# Patient Record
Sex: FEMALE | Race: WHITE | NOT HISPANIC OR LATINO | Employment: OTHER | URBAN - METROPOLITAN AREA
[De-identification: names, ages, dates, MRNs, and addresses within clinical notes are randomized per-mention and may not be internally consistent; named-entity substitution may affect disease eponyms.]

---

## 2017-01-31 DIAGNOSIS — M47.816 SPONDYLOSIS OF LUMBAR REGION WITHOUT MYELOPATHY OR RADICULOPATHY: Primary | ICD-10-CM

## 2017-02-01 ENCOUNTER — TELEPHONE (OUTPATIENT)
Dept: PAIN MEDICINE | Facility: CLINIC | Age: 67
End: 2017-02-01

## 2017-02-15 ENCOUNTER — OFFICE VISIT (OUTPATIENT)
Dept: NEUROSURGERY | Facility: CLINIC | Age: 67
End: 2017-02-15

## 2017-02-15 VITALS — WEIGHT: 216 LBS | TEMPERATURE: 97.6 F | BODY MASS INDEX: 34.72 KG/M2 | HEIGHT: 66 IN

## 2017-02-15 DIAGNOSIS — M47.816 FACET ARTHRITIS OF LUMBAR REGION: ICD-10-CM

## 2017-02-15 DIAGNOSIS — M51.36 DEGENERATIVE DISC DISEASE, LUMBAR: ICD-10-CM

## 2017-02-15 DIAGNOSIS — M48.062 LUMBAR STENOSIS WITH NEUROGENIC CLAUDICATION: Primary | ICD-10-CM

## 2017-02-15 PROCEDURE — 99203 OFFICE O/P NEW LOW 30 MIN: CPT | Performed by: NEUROLOGICAL SURGERY

## 2017-02-15 RX ORDER — MONTELUKAST SODIUM 10 MG/1
10 TABLET ORAL DAILY
COMMUNITY
Start: 2017-02-09 | End: 2017-10-11

## 2017-02-15 NOTE — PROGRESS NOTES
Patient: Una Villarreal  : 1950    Primary Care Provider: Zachary Reddy DO    Requesting Provider: As above      History    Chief Complaint: Low back pain.    History of Present Illness: The patient is a 66-year-old retired  who describes a 5-6-year history of low back pain.  She denies any inciting or precipitating events.  If she stands or walks too long she gets a burning in her thighs and her legs will get heavy.  She reports no bowel or bladder dysfunction.  She's been treated with Neurontin.  She has undergone multiple rhizotomies.  She's had a single epidural injection done.  She has also been treated with physical therapy.  Sometimes sitting is helpful.  She can lie in certain positions and feels better but other positions are more uncomfortable.  She is worse with standing, bending, walking, or pursuing housework.    Review of Systems   Constitutional: Positive for activity change and fatigue. Negative for appetite change, chills, diaphoresis, fever and unexpected weight change.   HENT: Negative for congestion, dental problem, drooling, ear discharge, ear pain, facial swelling, hearing loss, mouth sores, nosebleeds, postnasal drip, rhinorrhea, sinus pressure, sneezing, sore throat, tinnitus, trouble swallowing and voice change.    Eyes: Negative for photophobia, pain, discharge, redness, itching and visual disturbance.   Respiratory: Positive for shortness of breath. Negative for apnea, cough, choking, chest tightness, wheezing and stridor.    Cardiovascular: Negative for chest pain, palpitations and leg swelling.   Gastrointestinal: Positive for constipation. Negative for abdominal distention, abdominal pain, anal bleeding, blood in stool, diarrhea, nausea, rectal pain and vomiting.   Endocrine: Negative for cold intolerance, heat intolerance, polydipsia, polyphagia and polyuria.   Genitourinary: Negative for decreased urine volume, difficulty urinating, dysuria, enuresis,  "flank pain, frequency, genital sores, hematuria and urgency.   Musculoskeletal: Positive for arthralgias, back pain and joint swelling. Negative for gait problem, myalgias, neck pain and neck stiffness.   Skin: Negative for color change, pallor, rash and wound.   Allergic/Immunologic: Positive for environmental allergies and food allergies. Negative for immunocompromised state.   Neurological: Positive for headaches. Negative for dizziness, tremors, seizures, syncope, facial asymmetry, speech difficulty, weakness, light-headedness and numbness.   Hematological: Positive for adenopathy. Does not bruise/bleed easily.   Psychiatric/Behavioral: Positive for dysphoric mood. Negative for agitation, behavioral problems, confusion, decreased concentration, hallucinations, self-injury, sleep disturbance and suicidal ideas. The patient is not nervous/anxious and is not hyperactive.        The patient's past medical history, past surgical history, family history, and social history have been reviewed at length in the electronic medical record.    Physical Exam:   Visit Vitals   • Temp 97.6 °F (36.4 °C)   • Ht 66\" (167.6 cm)   • Wt 216 lb (98 kg)   • BMI 34.86 kg/m2     CONSTITUTIONAL: Patient is well-nourished, pleasant and appears stated age.  CV: Heart regular rate and rhythm without murmur, rub, or gallop.  PULMONARY: Lungs are clear to ascultation.  MUSCULOSKELETAL:  Straight leg raising is negative.  Gary's Sign is negative.  ROM in back normal.  Tenderness in the back to palpation is not observed.  NEUROLOGICAL:  Orientation, memory, attention span, language function, and cognition have been examined and are intact.  Strength is intact in the lower extremities to direct testing.  Muscle tone is normal throughout.  Station and gait are stooped.  Sensation is intact to light touch testing throughout.  Deep tendon reflexes are 2+ and symmetrical at the knees and trace at the ankles.  Coordination is intact.      Medical " Decision Making    Data Review:   MRI of the lumbar spine dated 9/20/2016 demonstrates multilevel degenerative disc disease.  There is some diffuse facet arthropathy.  There is some mild canal narrowing at L4-5.    Diagnosis:   The most part the patient is afflicted with mechanical low back pain.  Some of her complaints do raise the specter of symptomatic neurogenic claudication.    Treatment Options:   I have recommended that the patient undergo lumbar CT myelography with upright images to see if there is significant stenosis in excess of what we are seeing on her MRI.  Further recommendations will ensue thereafter.       Diagnosis Plan   1. Lumbar stenosis with neurogenic claudication     2. Degenerative disc disease, lumbar     3. Facet arthritis of lumbar region         Scribed for Ever Samaniego MD by Aneta Duarte CMA on 02/15/2017 at 3:06 PM    I, Dr. Samaniego, personally performed the services described in the documentation, as scribed in my presence, and it is both accurate and complete.

## 2017-02-21 ENCOUNTER — HOSPITAL ENCOUNTER (INPATIENT)
Dept: INTERVENTIONAL RADIOLOGY/VASCULAR | Facility: HOSPITAL | Age: 67
LOS: 2 days | Discharge: HOME | End: 2017-02-23
Attending: NEUROLOGICAL SURGERY | Admitting: NEUROLOGICAL SURGERY

## 2017-02-21 ENCOUNTER — HOSPITAL ENCOUNTER (OUTPATIENT)
Dept: CT IMAGING | Facility: HOSPITAL | Age: 67
Discharge: HOME | End: 2017-02-21

## 2017-02-21 ENCOUNTER — APPOINTMENT (OUTPATIENT)
Dept: CT IMAGING | Facility: HOSPITAL | Age: 67
End: 2017-02-21

## 2017-02-21 ENCOUNTER — APPOINTMENT (OUTPATIENT)
Dept: GENERAL RADIOLOGY | Facility: HOSPITAL | Age: 67
End: 2017-02-21

## 2017-02-21 ENCOUNTER — APPOINTMENT (OUTPATIENT)
Dept: NEUROLOGY | Facility: HOSPITAL | Age: 67
End: 2017-02-21

## 2017-02-21 DIAGNOSIS — M48.062 LUMBAR STENOSIS WITH NEUROGENIC CLAUDICATION: ICD-10-CM

## 2017-02-21 PROBLEM — R56.9 GENERALIZED SEIZURE (HCC): Status: ACTIVE | Noted: 2017-02-21

## 2017-02-21 LAB
ANION GAP SERPL CALCULATED.3IONS-SCNC: 9 MMOL/L (ref 3–11)
BUN BLD-MCNC: 14 MG/DL (ref 9–23)
BUN/CREAT SERPL: 17.5 (ref 7–25)
CALCIUM SPEC-SCNC: 9.5 MG/DL (ref 8.7–10.4)
CHLORIDE SERPL-SCNC: 100 MMOL/L (ref 99–109)
CO2 SERPL-SCNC: 23 MMOL/L (ref 20–31)
CREAT BLD-MCNC: 0.8 MG/DL (ref 0.6–1.3)
DEPRECATED RDW RBC AUTO: 45.2 FL (ref 37–54)
ERYTHROCYTE [DISTWIDTH] IN BLOOD BY AUTOMATED COUNT: 12.8 % (ref 11.3–14.5)
GFR SERPL CREATININE-BSD FRML MDRD: 72 ML/MIN/1.73
GLUCOSE BLD-MCNC: 99 MG/DL (ref 70–100)
HCT VFR BLD AUTO: 37.9 % (ref 34.5–44)
HGB BLD-MCNC: 12.8 G/DL (ref 11.5–15.5)
MCH RBC QN AUTO: 32.6 PG (ref 27–31)
MCHC RBC AUTO-ENTMCNC: 33.8 G/DL (ref 32–36)
MCV RBC AUTO: 96.4 FL (ref 80–99)
PLATELET # BLD AUTO: 248 10*3/MM3 (ref 150–450)
PMV BLD AUTO: 9.5 FL (ref 6–12)
POTASSIUM BLD-SCNC: 4 MMOL/L (ref 3.5–5.5)
RBC # BLD AUTO: 3.93 10*6/MM3 (ref 3.89–5.14)
SODIUM BLD-SCNC: 132 MMOL/L (ref 132–146)
WBC NRBC COR # BLD: 5.65 10*3/MM3 (ref 3.5–10.8)

## 2017-02-21 PROCEDURE — 95819 EEG AWAKE AND ASLEEP: CPT

## 2017-02-21 PROCEDURE — 72120 X-RAY BEND ONLY L-S SPINE: CPT

## 2017-02-21 PROCEDURE — 70450 CT HEAD/BRAIN W/O DYE: CPT

## 2017-02-21 PROCEDURE — 85027 COMPLETE CBC AUTOMATED: CPT | Performed by: NEUROLOGICAL SURGERY

## 2017-02-21 PROCEDURE — 25010000003 LEVETIRACETAM IN NACL 0.75% 1000 MG/100ML SOLUTION: Performed by: NEUROLOGICAL SURGERY

## 2017-02-21 PROCEDURE — 0 IOPAMIDOL 41 % SOLUTION: Performed by: NEUROLOGICAL SURGERY

## 2017-02-21 PROCEDURE — 25010000002 PROMETHAZINE PER 50 MG: Performed by: NEUROLOGICAL SURGERY

## 2017-02-21 PROCEDURE — 72265 MYELOGRAPHY L-S SPINE: CPT

## 2017-02-21 PROCEDURE — 72131 CT LUMBAR SPINE W/O DYE: CPT

## 2017-02-21 PROCEDURE — 80048 BASIC METABOLIC PNL TOTAL CA: CPT | Performed by: NEUROLOGICAL SURGERY

## 2017-02-21 PROCEDURE — 25010000002 LEVETIRACETAM IN NACL 0.82% 500 MG/100ML SOLUTION: Performed by: PHYSICIAN ASSISTANT

## 2017-02-21 PROCEDURE — 25010000002 LORAZEPAM PER 2 MG: Performed by: NEUROLOGICAL SURGERY

## 2017-02-21 PROCEDURE — 25810000003 DEXTROSE-NACL PER 500 ML: Performed by: PHYSICIAN ASSISTANT

## 2017-02-21 RX ORDER — CITALOPRAM 40 MG/1
40 TABLET ORAL DAILY
COMMUNITY
End: 2021-02-23 | Stop reason: ALTCHOICE

## 2017-02-21 RX ORDER — PROMETHAZINE HYDROCHLORIDE 25 MG/ML
25 INJECTION, SOLUTION INTRAMUSCULAR; INTRAVENOUS ONCE AS NEEDED
Status: COMPLETED | OUTPATIENT
Start: 2017-02-21 | End: 2017-02-21

## 2017-02-21 RX ORDER — SODIUM CHLORIDE, SODIUM LACTATE, POTASSIUM CHLORIDE, CALCIUM CHLORIDE 600; 310; 30; 20 MG/100ML; MG/100ML; MG/100ML; MG/100ML
100 INJECTION, SOLUTION INTRAVENOUS CONTINUOUS
Status: DISCONTINUED | OUTPATIENT
Start: 2017-02-21 | End: 2017-02-21

## 2017-02-21 RX ORDER — ONDANSETRON 2 MG/ML
4 INJECTION INTRAMUSCULAR; INTRAVENOUS EVERY 6 HOURS PRN
Status: DISCONTINUED | OUTPATIENT
Start: 2017-02-21 | End: 2017-02-23 | Stop reason: HOSPADM

## 2017-02-21 RX ORDER — PROMETHAZINE HYDROCHLORIDE 25 MG/1
25 TABLET ORAL ONCE AS NEEDED
Status: DISCONTINUED | OUTPATIENT
Start: 2017-02-21 | End: 2017-02-23 | Stop reason: HOSPADM

## 2017-02-21 RX ORDER — MAGNESIUM HYDROXIDE/ALUMINUM HYDROXICE/SIMETHICONE 120; 1200; 1200 MG/30ML; MG/30ML; MG/30ML
30 SUSPENSION ORAL ONCE AS NEEDED
Status: DISCONTINUED | OUTPATIENT
Start: 2017-02-21 | End: 2017-02-23 | Stop reason: HOSPADM

## 2017-02-21 RX ORDER — LIDOCAINE HYDROCHLORIDE 10 MG/ML
5 INJECTION, SOLUTION INFILTRATION; PERINEURAL ONCE
Status: COMPLETED | OUTPATIENT
Start: 2017-02-21 | End: 2017-02-21

## 2017-02-21 RX ORDER — DEXTROSE AND SODIUM CHLORIDE 5; .9 G/100ML; G/100ML
125 INJECTION, SOLUTION INTRAVENOUS CONTINUOUS
Status: DISCONTINUED | OUTPATIENT
Start: 2017-02-21 | End: 2017-02-22

## 2017-02-21 RX ORDER — LEVOTHYROXINE SODIUM 0.05 MG/1
50 TABLET ORAL DAILY
COMMUNITY
End: 2021-02-23 | Stop reason: DRUGHIGH

## 2017-02-21 RX ORDER — ONDANSETRON 4 MG/1
4 TABLET, FILM COATED ORAL ONCE AS NEEDED
Status: DISCONTINUED | OUTPATIENT
Start: 2017-02-21 | End: 2017-02-23 | Stop reason: HOSPADM

## 2017-02-21 RX ORDER — LEVETIRACETAM 5 MG/ML
500 INJECTION INTRAVASCULAR EVERY 12 HOURS SCHEDULED
Status: DISCONTINUED | OUTPATIENT
Start: 2017-02-21 | End: 2017-02-22

## 2017-02-21 RX ORDER — ACETAMINOPHEN 10 MG/ML
1000 INJECTION, SOLUTION INTRAVENOUS EVERY 8 HOURS PRN
Status: DISCONTINUED | OUTPATIENT
Start: 2017-02-21 | End: 2017-02-23 | Stop reason: HOSPADM

## 2017-02-21 RX ORDER — LORAZEPAM 2 MG/ML
1 INJECTION INTRAMUSCULAR ONCE
Status: COMPLETED | OUTPATIENT
Start: 2017-02-21 | End: 2017-02-21

## 2017-02-21 RX ORDER — ACETAMINOPHEN 500 MG
500 TABLET ORAL EVERY 4 HOURS PRN
Status: DISCONTINUED | OUTPATIENT
Start: 2017-02-21 | End: 2017-02-23 | Stop reason: HOSPADM

## 2017-02-21 RX ORDER — LEVETIRACETAM 10 MG/ML
1000 INJECTION INTRAVASCULAR
Status: COMPLETED | OUTPATIENT
Start: 2017-02-21 | End: 2017-02-21

## 2017-02-21 RX ADMIN — LORAZEPAM 1 MG: 2 INJECTION INTRAMUSCULAR; INTRAVENOUS at 09:34

## 2017-02-21 RX ADMIN — LORAZEPAM 2 MG: 2 INJECTION INTRAMUSCULAR; INTRAVENOUS at 09:42

## 2017-02-21 RX ADMIN — LORAZEPAM 1 MG: 2 INJECTION INTRAMUSCULAR; INTRAVENOUS at 09:20

## 2017-02-21 RX ADMIN — LORAZEPAM 1 MG: 2 INJECTION INTRAMUSCULAR; INTRAVENOUS at 09:15

## 2017-02-21 RX ADMIN — SODIUM CHLORIDE, POTASSIUM CHLORIDE, SODIUM LACTATE AND CALCIUM CHLORIDE 100 ML/HR: 600; 310; 30; 20 INJECTION, SOLUTION INTRAVENOUS at 08:45

## 2017-02-21 RX ADMIN — PROMETHAZINE HYDROCHLORIDE 25 MG: 25 INJECTION INTRAMUSCULAR; INTRAVENOUS at 08:45

## 2017-02-21 RX ADMIN — LEVETIRACETAM 500 MG: 5 INJECTION INTRAVENOUS at 20:03

## 2017-02-21 RX ADMIN — DEXTROSE AND SODIUM CHLORIDE 125 ML/HR: 5; 900 INJECTION, SOLUTION INTRAVENOUS at 15:02

## 2017-02-21 RX ADMIN — LIDOCAINE HYDROCHLORIDE 5 ML: 10 INJECTION, SOLUTION INFILTRATION; PERINEURAL at 07:32

## 2017-02-21 RX ADMIN — IOPAMIDOL 20 ML: 408 INJECTION, SOLUTION INTRATHECAL at 07:35

## 2017-02-21 RX ADMIN — LEVETIRACETAM 1000 MG: 10 INJECTION INTRAVENOUS at 09:45

## 2017-02-21 RX ADMIN — DEXTROSE AND SODIUM CHLORIDE 125 ML/HR: 5; 900 INJECTION, SOLUTION INTRAVENOUS at 22:27

## 2017-02-21 NOTE — NURSING NOTE
Dr Samaniego returned call. Informed of patient seizing, Ativan given. Pt post ictal at present. Vital signs being monitored

## 2017-02-21 NOTE — NURSING NOTE
Called to room by spouse. Patient seizing. Turned to side, suction available, O2 per nc started and rapid response called. Dr Samaniego paged to inform.

## 2017-02-21 NOTE — NURSING NOTE
Dr Samaniego paged and returned call. Notified of pt with N/V - order for IV Phenergan. Order received to start IV and fluids, additional order for IV Zofran if needed.

## 2017-02-21 NOTE — NURSING NOTE
Pt reports having surgery on lt hand last week. No drsg, sutures clean and dry. No redness or edema.

## 2017-02-21 NOTE — NURSING NOTE
Returned to room. Complains of headache, nausea. Sipping water and pepsi. Band aid drsg mid lower back. Head of bed elevated and encouraged to drink additional fluids.

## 2017-02-21 NOTE — H&P
Neurosurgical Admission    Admitting Physician:  Ever Samaniego M.D.    Reason for Admission: New onset grand mal seizure status post lumbar myelogram.    Chief complaint:  Seizure. Headache.    History of present illness:  Mrs. Villarreal is a 66-year-old left-handed retired  who has history of multilevel degenerative disc disease of the lumbar spine and question of neurogenic claudication.  She was recently seen by Dr. Samaniego in his office on 2/15/2017, at which time he reviewed an MRI scan of her lumbar spine dated 9/20/2016 that demonstrated multilevel degenerative disc disease, some diffuse facet arthropathy as well as mild canal narrowing at L4-L5.  Her complaints on that visit also raised the specter of symptomatic neurogenic claudication.  Dr. Samaniego recommended that the patient undergo lumbar CT myelography with upright images to see if there was indeed significant stenosis in excess of what was seen on her MRI scan.  Today, the patient underwent the CT myelogram, but suffered a post-procedure grand mal seizure.  Rapid response was called and she was subsequently given several doses of Ativan and 1000 mg of Keppra for seizure control.  EEG was later obtained and did not reveal seizure activity.  She does not have seizure history.  The patient is currently somnolent, but opens eyes to voice.  She minimally converses, but follows most commands.  She complains of a headache.  She is moving all extremities.  A CT scan of the brain has been ordered.    History:  Past Medical History   Diagnosis Date   • Alcohol dependence      continuous drinking   • Arthritis    • Depression    • Encephalitis    • Facet syndrome, lumbar    • Generalized seizure 2/21/2017   • Headache    • Lumbar canal stenosis    • Meniscus tear      right knee   • RA (rheumatoid arthritis)      Past Surgical History   Procedure Laterality Date   • Appendectomy     • Hysterectomy     • Carpal tunnel release with cubital tunnel  "release Right    • Tonsillectomy     • Cholecystectomy  01/2016   • Tubal abdominal ligation     • Tumor removal  1990     fatty tumor on right shoulder   • Elbow procedure  1996     \"fish cutters elbow\" repair   • Knee cartilage surgery  2005   • Brain surgery  2007     pituitary tumor   • Cholecystectomy     • Trigger finger release Left 02/17/2017     Family History   Problem Relation Age of Onset   • Lung cancer Maternal Grandmother    • Stroke Maternal Grandfather    • Lung cancer Maternal Grandfather    • Stroke Paternal Grandmother    • Brain cancer Other    • COPD Mother    • Heart failure Father      Social History   Substance Use Topics   • Smoking status: Former Smoker     Quit date: 2/15/1987   • Smokeless tobacco: Never Used   • Alcohol use Yes      Comment: socially       Medications:    Current Facility-Administered Medications:   •  acetaminophen (TYLENOL) tablet 500 mg, 500 mg, Oral, Q4H PRN, Ever Samaniego MD  •  aluminum-magnesium hydroxide-simethicone (MAALOX/MYLANTA) suspension 30 mL, 30 mL, Oral, Once PRN, Ever Samaniego MD  •  dextrose 5 % and sodium chloride 0.9 % infusion, 125 mL/hr, Intravenous, Continuous, Cammie Feliciano PA-C  •  ondansetron (ZOFRAN) injection 4 mg, 4 mg, Intravenous, Q6H PRN, Ever Samaniego MD  •  ondansetron (ZOFRAN) tablet 4 mg, 4 mg, Oral, Once PRN, Ever Samaniego MD  •  promethazine (PHENERGAN) tablet 25 mg, 25 mg, Oral, Once PRN, Ever Samaniego MD    Allergies:  Aspirin; Codeine; Other; Trichophyton; and Latex    Review of Systems:  Unable to obtain due to patient's altered mental status.    Vital Signs:   Temp:  [97.8 °F (36.6 °C)] 97.8 °F (36.6 °C)  Heart Rate:  [66-97] 84  Resp:  [18-20] 18  BP: (122-198)/() 155/93    Physical Exam:  Gen: Well-nourished, well-developed white female in no acute distress.  Normocephalic, atraumatic.  HEENT: Hearing intact bilaterally.  Extraocular movements grossly intact.  Pupils equal, round, reactive to light and " accommodation.    Neck: Range of motion within normal limits.  Heart: Regular rate and rhythm without murmur, gallop or rub.  Lungs: Clear to auscultation bilaterally.  Abdomen: Soft.  Nontender.  Positive bowel sounds in 4 quadrants.  Vascular: Radial and pedal pulses 2+ bilaterally.  No carotid bruits appreciated.  Extremities: No clubbing, cyanosis or edema.  Neuromotor exam: Patient is mostly somnolent, more than likely due to antiseizure medications on board, but arouses and opens eyes to vocal command and tactile stimulus. She is oriented to place and year.  She follows most commands appropriately; having to be redirected at times.  Speech is intermittently clear and fluent, otherwise nonsensical at times.  Motor strength is 5 out of 5 in the upper and lower extremities.  She is able to understand and express that sensation to light touch is intact throughout.  Bicep and tricep reflexes difficult to elicit bilaterally; trace patella bilaterally, difficult to elicit in the Achilles bilaterally.  Cranial nerve exam: Cranial nerve II: Funduscopic exam does not reveal edema. Exam is limited due to patient's somnolence.  Cranial nerves III, IV and VI: Pupils equal, round, reactive to light and accommodation.  Extra ocular movements grossly intact.  Cranial nerve V: Facial sensation is intact and equal to light touch throughout.  Cranial nerve VII: Smile is slight, but equal.  Cranial nerve VIII: Hearing intact to finger rub bilaterally.  Cranial nerves IX and X difficult to test;; normal phonation.  Cranial nerve XI: Shoulder shrug intact bilaterally.  Cranial nerve XII: Tongue is midline without atrophy or fasciculation.  Cerebellar and gait testing unable to be tested due to patient's mental status.      Results from last 7 days  Lab Units 02/21/17  0918   WBC 10*3/mm3 5.65   HEMOGLOBIN g/dL 12.8   HEMATOCRIT % 37.9   PLATELETS 10*3/mm3 248       Results from last 7 days  Lab Units 02/21/17  0918   SODIUM mmol/L  132   POTASSIUM mmol/L 4.0   CHLORIDE mmol/L 100   TOTAL CO2 mmol/L 23.0   BUN mg/dL 14   CREATININE mg/dL 0.80   CALCIUM mg/dL 9.5   GLUCOSE mg/dL 99       Impression:  1.  Post lumbar myelogram grand mal seizure; now controlled with medication, no seizure activity on EEG  2.  Mechanical low back pain with lumbar stenosis and neurogenic claudication.  3.  Depression  4.  History of alcohol dependence; very minimal alcohol consumption over the last year according to .  5.  Restless leg syndrome.  6.  Migraines.  7.  Rheumatoid Arthritis.  8.  COPD  9.  History of pituitary tumor surgery 2007; on DDAVP.  10.  Patient is on thyroid replacement medication.   thinks patient has had thyroid surgery, but is unsure.    Plan:   Admission to the ICU for monitoring and continued anti-seizure medication/control.  Obtain CT scan of the brain.        Cammie Feliciano PA-C  02/21/17  11:26 AM

## 2017-02-21 NOTE — NURSING NOTE
Brook MALAGON for Dr. Samaniego in to see pt. Arouses with stimulus,slow to respond, yet oriented to person, place and time.

## 2017-02-21 NOTE — PROGRESS NOTES
MYELOGRAM PROCEDURE NOTE  Neurosurgery    Patient: Una Villarreal  : 1950      PreOp Diagnosis: Lumbar stenosis    PostOp Diagnosis: Same    Procedure: Lumbar myelogram    Surgeon: Aden    Anesthesia: 1% lidocaine    Technique:   Spinal needle: 22 gauge   Contrast: Isovue 200 (20ml)   Injection site: L L3    EBL: Trace    Specimens removed: None    Complication: None        Ever Samaniego MD  17  7:33 AM

## 2017-02-21 NOTE — PLAN OF CARE
Problem: Pressure Ulcer Risk (Maurilio Scale) (Adult,Obstetrics,Pediatric)  Goal: Identify Related Risk Factors and Signs and Symptoms  Outcome: Ongoing (interventions implemented as appropriate)  Goal: Skin Integrity  Outcome: Ongoing (interventions implemented as appropriate)    Problem: Seizure Disorder/Epilepsy (Adult)  Goal: Signs and Symptoms of Listed Potential Problems Will be Absent or Manageable (Seizure Disorder/Epilepsy)  Outcome: Ongoing (interventions implemented as appropriate)

## 2017-02-22 PROCEDURE — 25010000003 LEVETIRACETAM IN NACL 0.75% 1000 MG/100ML SOLUTION: Performed by: PHYSICIAN ASSISTANT

## 2017-02-22 PROCEDURE — 25010000002 LORAZEPAM PER 2 MG

## 2017-02-22 PROCEDURE — 94760 N-INVAS EAR/PLS OXIMETRY 1: CPT

## 2017-02-22 PROCEDURE — 25810000003 DEXTROSE-NACL PER 500 ML: Performed by: PHYSICIAN ASSISTANT

## 2017-02-22 PROCEDURE — 94799 UNLISTED PULMONARY SVC/PX: CPT

## 2017-02-22 PROCEDURE — 94640 AIRWAY INHALATION TREATMENT: CPT

## 2017-02-22 PROCEDURE — B00B1ZZ PLAIN RADIOGRAPHY OF SPINAL CORD USING LOW OSMOLAR CONTRAST: ICD-10-PCS | Performed by: NEUROLOGICAL SURGERY

## 2017-02-22 PROCEDURE — 25010000002 LEVETIRACETAM IN NACL 0.82% 500 MG/100ML SOLUTION: Performed by: PHYSICIAN ASSISTANT

## 2017-02-22 RX ORDER — CITALOPRAM 40 MG/1
40 TABLET ORAL DAILY
Status: DISCONTINUED | OUTPATIENT
Start: 2017-02-22 | End: 2017-02-23 | Stop reason: HOSPADM

## 2017-02-22 RX ORDER — MONTELUKAST SODIUM 10 MG/1
10 TABLET ORAL DAILY
Status: DISCONTINUED | OUTPATIENT
Start: 2017-02-22 | End: 2017-02-23 | Stop reason: HOSPADM

## 2017-02-22 RX ORDER — TIZANIDINE 4 MG/1
2 TABLET ORAL 3 TIMES DAILY
Status: DISCONTINUED | OUTPATIENT
Start: 2017-02-22 | End: 2017-02-23 | Stop reason: HOSPADM

## 2017-02-22 RX ORDER — DESMOPRESSIN ACETATE 0.2 MG/1
100 TABLET ORAL 2 TIMES DAILY
Status: DISCONTINUED | OUTPATIENT
Start: 2017-02-22 | End: 2017-02-23 | Stop reason: HOSPADM

## 2017-02-22 RX ORDER — ACETAMINOPHEN 500 MG
500 TABLET ORAL EVERY 4 HOURS PRN
Status: DISCONTINUED | OUTPATIENT
Start: 2017-02-22 | End: 2017-02-23 | Stop reason: HOSPADM

## 2017-02-22 RX ORDER — SUMATRIPTAN 6 MG/.5ML
6 INJECTION, SOLUTION SUBCUTANEOUS ONCE AS NEEDED
Status: DISCONTINUED | OUTPATIENT
Start: 2017-02-22 | End: 2017-02-23 | Stop reason: HOSPADM

## 2017-02-22 RX ORDER — LORAZEPAM 2 MG/ML
INJECTION INTRAMUSCULAR
Status: DISCONTINUED
Start: 2017-02-22 | End: 2017-02-22 | Stop reason: WASHOUT

## 2017-02-22 RX ORDER — TOPIRAMATE 25 MG/1
50 TABLET ORAL DAILY
Status: DISCONTINUED | OUTPATIENT
Start: 2017-02-22 | End: 2017-02-23 | Stop reason: HOSPADM

## 2017-02-22 RX ORDER — PRAVASTATIN SODIUM 40 MG
40 TABLET ORAL DAILY
Status: DISCONTINUED | OUTPATIENT
Start: 2017-02-22 | End: 2017-02-23 | Stop reason: HOSPADM

## 2017-02-22 RX ORDER — FAMOTIDINE 20 MG/1
40 TABLET, FILM COATED ORAL NIGHTLY
Status: DISCONTINUED | OUTPATIENT
Start: 2017-02-22 | End: 2017-02-23 | Stop reason: HOSPADM

## 2017-02-22 RX ORDER — PANTOPRAZOLE SODIUM 40 MG/1
40 TABLET, DELAYED RELEASE ORAL
Status: DISCONTINUED | OUTPATIENT
Start: 2017-02-22 | End: 2017-02-23 | Stop reason: HOSPADM

## 2017-02-22 RX ORDER — LORAZEPAM 2 MG/ML
INJECTION INTRAMUSCULAR
Status: COMPLETED
Start: 2017-02-22 | End: 2017-02-22

## 2017-02-22 RX ORDER — LEVETIRACETAM 500 MG/1
500 TABLET ORAL 2 TIMES DAILY
Qty: 60 TABLET | Refills: 1 | Status: SHIPPED | OUTPATIENT
Start: 2017-02-22 | End: 2017-02-23

## 2017-02-22 RX ORDER — GABAPENTIN 400 MG/1
400 CAPSULE ORAL 3 TIMES DAILY
Status: DISCONTINUED | OUTPATIENT
Start: 2017-02-22 | End: 2017-02-23 | Stop reason: HOSPADM

## 2017-02-22 RX ORDER — ROPINIROLE 0.5 MG/1
0.25 TABLET, FILM COATED ORAL NIGHTLY
Status: DISCONTINUED | OUTPATIENT
Start: 2017-02-22 | End: 2017-02-23 | Stop reason: HOSPADM

## 2017-02-22 RX ORDER — BUSPIRONE HYDROCHLORIDE 10 MG/1
10 TABLET ORAL 2 TIMES DAILY
Status: DISCONTINUED | OUTPATIENT
Start: 2017-02-22 | End: 2017-02-23 | Stop reason: HOSPADM

## 2017-02-22 RX ORDER — LORAZEPAM 2 MG/ML
INJECTION INTRAMUSCULAR
Status: DISPENSED
Start: 2017-02-22 | End: 2017-02-22

## 2017-02-22 RX ORDER — CETIRIZINE HYDROCHLORIDE 10 MG/1
5 TABLET ORAL DAILY
Status: DISCONTINUED | OUTPATIENT
Start: 2017-02-22 | End: 2017-02-23 | Stop reason: HOSPADM

## 2017-02-22 RX ORDER — MIRTAZAPINE 15 MG/1
15 TABLET, FILM COATED ORAL NIGHTLY
Status: DISCONTINUED | OUTPATIENT
Start: 2017-02-22 | End: 2017-02-23 | Stop reason: HOSPADM

## 2017-02-22 RX ORDER — LEVOTHYROXINE SODIUM 0.05 MG/1
50 TABLET ORAL DAILY
Status: DISCONTINUED | OUTPATIENT
Start: 2017-02-22 | End: 2017-02-23 | Stop reason: HOSPADM

## 2017-02-22 RX ORDER — LEVETIRACETAM 10 MG/ML
1000 INJECTION INTRAVASCULAR EVERY 12 HOURS
Status: DISCONTINUED | OUTPATIENT
Start: 2017-02-22 | End: 2017-02-23 | Stop reason: HOSPADM

## 2017-02-22 RX ADMIN — ALBUTEROL SULFATE 2.5 MG: 2.5 SOLUTION RESPIRATORY (INHALATION) at 21:00

## 2017-02-22 RX ADMIN — DEXTROSE AND SODIUM CHLORIDE 125 ML/HR: 5; 900 INJECTION, SOLUTION INTRAVENOUS at 05:54

## 2017-02-22 RX ADMIN — GABAPENTIN 400 MG: 400 CAPSULE ORAL at 15:37

## 2017-02-22 RX ADMIN — FAMOTIDINE 40 MG: 20 TABLET ORAL at 20:06

## 2017-02-22 RX ADMIN — TIZANIDINE 2 MG: 4 TABLET ORAL at 20:06

## 2017-02-22 RX ADMIN — LORAZEPAM 2 MG: 2 INJECTION INTRAMUSCULAR; INTRAVENOUS at 11:45

## 2017-02-22 RX ADMIN — CETIRIZINE HYDROCHLORIDE 5 MG: 10 TABLET, FILM COATED ORAL at 12:30

## 2017-02-22 RX ADMIN — MIRTAZAPINE 15 MG: 15 TABLET, FILM COATED ORAL at 20:06

## 2017-02-22 RX ADMIN — MONTELUKAST SODIUM 10 MG: 10 TABLET, FILM COATED ORAL at 12:30

## 2017-02-22 RX ADMIN — ROPINIROLE HYDROCHLORIDE 0.25 MG: 0.5 TABLET, FILM COATED ORAL at 20:06

## 2017-02-22 RX ADMIN — LEVOTHYROXINE SODIUM 50 MCG: 50 TABLET ORAL at 12:29

## 2017-02-22 RX ADMIN — LEVETIRACETAM 1000 MG: 10 INJECTION INTRAVENOUS at 14:20

## 2017-02-22 RX ADMIN — TOPIRAMATE 50 MG: 25 TABLET, FILM COATED ORAL at 12:30

## 2017-02-22 RX ADMIN — BUSPIRONE HYDROCHLORIDE 10 MG: 10 TABLET ORAL at 12:32

## 2017-02-22 RX ADMIN — DESMOPRESSIN ACETATE 100 MCG: 0.2 TABLET ORAL at 17:09

## 2017-02-22 RX ADMIN — LEVETIRACETAM 500 MG: 5 INJECTION INTRAVENOUS at 08:21

## 2017-02-22 RX ADMIN — TIZANIDINE 2 MG: 4 TABLET ORAL at 15:37

## 2017-02-22 RX ADMIN — BUSPIRONE HYDROCHLORIDE 10 MG: 10 TABLET ORAL at 17:09

## 2017-02-22 RX ADMIN — DESMOPRESSIN ACETATE 100 MCG: 0.2 TABLET ORAL at 14:29

## 2017-02-22 RX ADMIN — GABAPENTIN 400 MG: 400 CAPSULE ORAL at 20:06

## 2017-02-22 RX ADMIN — TIZANIDINE 2 MG: 4 TABLET ORAL at 12:35

## 2017-02-22 RX ADMIN — GABAPENTIN 400 MG: 400 CAPSULE ORAL at 12:30

## 2017-02-22 RX ADMIN — CITALOPRAM HYDROBROMIDE 40 MG: 40 TABLET ORAL at 12:32

## 2017-02-22 RX ADMIN — PRAVASTATIN SODIUM 40 MG: 40 TABLET ORAL at 12:29

## 2017-02-22 NOTE — PROGRESS NOTES
Adult Nutrition  Assessment/PES    Patient Name:  Una Villarreal  YOB: 1950  MRN: [id number]  Admit Date:  2/21/2017    Assessment Date:  2/22/2017        Reason for Assessment       02/22/17 1526    Reason for Assessment    Reason For Assessment/Visit multidisciplinary rounds    Identified At Risk By Screening Criteria no indicators present    Time Spent (min) 15    Diagnosis Diagnosis   notes and dx of this adm reviewed   Patient Active Problem List   Diagnosis   • Doris's syndrome   • Arthralgia of hip   • Lumbar facet arthropathy/lumbar spondylosis without myelopathy   • Greater trochanteric bursitis of both hips   • DDD (degenerative disc disease), lumbar   • Myofascial pain   • History of sacroiliac joint dysfunction   • Right knee meniscal tear   • Depression   • Physical deconditioning   • History of alcohol dependence   • Overweight (BMI 25.0-29.9)   • Restless legs syndrome (RLS)   • Generalized seizure                Nutrition/Diet History       02/22/17 1526    Nutrition/Diet History    Reported/Observed By RN    Other eating; had witnessed seizure this am              Labs/Tests/Procedures/Meds       02/22/17 1527    Labs/Tests/Procedures/Meds    Labs/Tests Review Reviewed                Nutrition Prescription Ordered       02/22/17 1527    Nutrition Prescription PO    Current PO Diet Regular            Evaluation of Received Nutrient/Fluid Intake       02/22/17 1527    PO Evaluation    Number of Days PO Intake Evaluated 1 day    Number of Meals 2    % PO Intake 75              Problem/Interventions:        Problem 1       02/22/17 1528    Nutrition Diagnoses Problem 1    Problem 1 No Nutrition Diagnosis at this Time                    Intervention Goal       02/22/17 1528    Intervention Goal    General Maintain nutrition            Nutrition Intervention       02/22/17 1528    Nutrition Intervention    RD/Tech Action Follow Tx progress;Care plan reviewd               Education/Evaluation       02/22/17 1529    Monitor/Evaluation    Monitor Per protocol        Comments:      Electronically signed by:  Cammie Sanz RD  02/22/17 3:29 PM

## 2017-02-22 NOTE — PROGRESS NOTES
Discharge Planning Assessment  [facility]     Patient Name: Una Villarreal  MRN: [id number]  Today's Date: 2/22/2017    Admit Date: 2/21/2017          Discharge Needs Assessment       02/22/17 1107    Living Environment    Lives With spouse    Living Arrangements house   Lives in 1 level home in [place].    Quality Of Family Relationships supportive    Able to Return to Prior Living Arrangements yes    Discharge Needs Assessment    Concerns To Be Addressed basic needs concerns;adjustment to diagnosis/illness concerns    Readmission Within The Last 30 Days no previous admission in last 30 days    Outpatient/Agency/Support Group Needs --   She has never used a HH agency.    Equipment Currently Used at Home none    Transportation Available car;family or friend will provide            Discharge Plan       02/22/17 1109    Case Management/Social Work Plan    Plan Home    Patient/Family In Agreement With Plan yes    Additional Comments Mrs. Villarreal lives in 1 level home c her spouse.  She has been independent c ADL's.  She has never used DME, HH or Home O2.  She states her Medicare and [employer] are current.  Her goal is to return home c assist from her .  CM will follow.          Discharge Placement     No information found        Expected Discharge Date and Time     Expected Discharge Date Expected Discharge Time    Feb 21, 2017               Demographic Summary       02/22/17 1059    Referral Information    Admission Type inpatient    Arrived From admitted as an inpatient    Referral Source admission list    Reason For Consult discharge planning    Contact Information    Permission Granted to Share Information With             Functional Status       02/22/17 1059    Functional Status Current    Ambulation 2-->assistive person    Transferring 2-->assistive person    Toileting 2-->assistive person    Bathing 2-->assistive person    Dressing 2-->assistive person    Eating 2-->assistive person     Communication 0-->understands/communicates without difficulty    Functional Status Prior    Ambulation 0-->independent    Transferring 0-->independent    Toileting 0-->independent    Bathing 0-->independent    Dressing 0-->independent    Eating 0-->independent    Communication 0-->understands/communicates without difficulty    Swallowing 0-->swallows foods/liquids without difficulty    IADL    Medications independent    Meal Preparation independent    Housekeeping independent    Laundry independent    Shopping independent    Oral Care independent            Psychosocial     None            Abuse/Neglect     None            Legal     None            Substance Abuse     None            Patient Forms     None          Selena Parikh RN

## 2017-02-22 NOTE — PLAN OF CARE
Problem: Seizure Disorder/Epilepsy (Adult)  Goal: Signs and Symptoms of Listed Potential Problems Will be Absent or Manageable (Seizure Disorder/Epilepsy)  Outcome: Ongoing (interventions implemented as appropriate)

## 2017-02-23 VITALS
OXYGEN SATURATION: 98 % | DIASTOLIC BLOOD PRESSURE: 73 MMHG | HEART RATE: 65 BPM | TEMPERATURE: 97.9 F | HEIGHT: 66 IN | BODY MASS INDEX: 34.72 KG/M2 | WEIGHT: 216.05 LBS | SYSTOLIC BLOOD PRESSURE: 141 MMHG | RESPIRATION RATE: 16 BRPM

## 2017-02-23 PROCEDURE — 94640 AIRWAY INHALATION TREATMENT: CPT

## 2017-02-23 PROCEDURE — 25010000003 LEVETIRACETAM IN NACL 0.75% 1000 MG/100ML SOLUTION: Performed by: PHYSICIAN ASSISTANT

## 2017-02-23 RX ORDER — LEVETIRACETAM 500 MG/1
1000 TABLET ORAL 2 TIMES DAILY
Qty: 60 TABLET | Refills: 1 | Status: SHIPPED | OUTPATIENT
Start: 2017-02-23 | End: 2017-05-04 | Stop reason: ALTCHOICE

## 2017-02-23 RX ADMIN — ALBUTEROL SULFATE 2.5 MG: 2.5 SOLUTION RESPIRATORY (INHALATION) at 07:44

## 2017-02-23 RX ADMIN — LEVETIRACETAM 1000 MG: 10 INJECTION INTRAVENOUS at 02:05

## 2017-02-23 RX ADMIN — ACETAMINOPHEN 500 MG: 500 TABLET ORAL at 06:00

## 2017-02-23 RX ADMIN — PANTOPRAZOLE SODIUM 40 MG: 40 TABLET, DELAYED RELEASE ORAL at 05:52

## 2017-02-23 NOTE — PLAN OF CARE
Problem: Seizure Disorder/Epilepsy (Adult)  Goal: Signs and Symptoms of Listed Potential Problems Will be Absent or Manageable (Seizure Disorder/Epilepsy)  Outcome: Outcome(s) achieved Date Met:  02/23/17

## 2017-02-23 NOTE — NURSING NOTE
Educated patient to call the office of the PCP & Dr. Peterson for an appointment 1 week. Patient wanted to eat breakfast before discharge but chose not stay long enough for the offices to open for us to make these appointments.

## 2017-02-23 NOTE — PROGRESS NOTES
"NEUROSURGERY PROGRESS NOTE     LOS: 2 days   Patient Care Team:  Zachary Reddy DO as PCP - General  Cheikh Alves MD as Consulting Physician (Anesthesiology)    Chief Complaint:   1. Low back pain.  2. Post myelogram seizure.    Interval History:   Patient Complaints: None.  Patient Denies: A, further seizures.    Review of Systems:   Musculoskeletal and Neurological systems were reviewed and are negative except for:  Musculoskeletal: positive for back pain.    Vital Signs: Blood pressure 141/73, pulse 61, temperature 97.9 °F (36.6 °C), temperature source Oral, resp. rate 16, height 66\" (167.6 cm), weight 216 lb 0.8 oz (98 kg), SpO2 98 %.  Intake/Output:   Intake/Output Summary (Last 24 hours) at 02/23/17 0609  Last data filed at 02/23/17 0200   Gross per 24 hour   Intake      0 ml   Output   1250 ml   Net  -1250 ml       Physical Exam:  Patient is awake, alert, and in good spirits.  She is well oriented.  Her speech is fluent.  She follows all commands.  There is no pronator drift.     Assessment/Plan:  1. Low back pain: The patient harbors a mild spinal stenosis. I do not think that these findings are significant enough to explain her chronic severe back pain syndrome. I do not think there is a role for surgical intervention on her lumbar spine at this point in time.  2. Post myelogram seizure: We'll continue Keppra for the short-term.  There was a question yesterday as to whether she had some additional seizures.  I'm not completely convinced that that was the case.  Her Keppra has been increased to 1000 mg twice a day.  3. Disposition: Mobilize patient.  Home later this morning.  The patient will follow up in our clinic in several weeks at which time we will wean off the Keppra.      Ever Samaniego MD  02/23/17  6:09 AM      "

## 2017-02-23 NOTE — PLAN OF CARE
Problem: Pressure Ulcer Risk (Maurilio Scale) (Adult,Obstetrics,Pediatric)  Goal: Identify Related Risk Factors and Signs and Symptoms  Outcome: Outcome(s) achieved Date Met:  02/23/17  Goal: Skin Integrity  Outcome: Outcome(s) achieved Date Met:  02/23/17

## 2017-02-24 NOTE — DISCHARGE SUMMARY
Date of Discharge:  2/24/2017      Problem List:  Principal Problem:    Generalized seizure      Presenting Problem/History of Present Illness  Lumbar stenosis with neurogenic claudication [M48.06]  Generalized seizure [R56.9]      Hospital Course  Mrs. Villarreal is a 66-year-old left-handed retired  who has history of multilevel degenerative disc disease of the lumbar spine, low back pain, and question of neurogenic claudication. She was recently seen by Dr. Samaniego in his office on 2/15/2017, at which time he reviewed an MRI scan of her lumbar spine dated 9/20/2016 that demonstrated multilevel degenerative disc disease, some diffuse facet arthropathy as well as mild canal narrowing at L4-L5. Her complaints on that visit also raised the specter of symptomatic neurogenic claudication. Dr. Samaniego recommended that the patient undergo lumbar CT myelography with upright images to see if there was indeed significant stenosis in excess of what was seen on her MRI scan. The patient underwent the CT myelogram, but suffered a post-procedure grand mal seizure. Rapid response was called and she was subsequently given several doses of Ativan and 1000 mg of Keppra for seizure control. EEG was later obtained and did not reveal seizure activity.  She did not have seizure history.  She was admitted to the ICU for observation and medical management.  Hospital day #2, the pt was awake, alert, moving all extremities, and ambulating.  Later in the morning, she suffered further seizures. The CT myelogram showed very mild truncation of the nerve roots bilaterally at the L4-5 level.  There was no evidence of instability on flexion/extension views.  Her Keppra dose was increased to 1000mg BID.  No other seizures were reported.  Hospital day #3, the pt was awake, alert, and oriented.   She was taking po and following all commands.  She was taking po and ambulating.  She was subsequently discharged home.        Consults:    Consults     No orders found from 1/23/2017 to 2/22/2017.          Condition on Discharge:  Stable and satisfactory.      Discharge Disposition  Home or Self Care    Discharge Medications   Una Villarreal   Home Medication Instructions RADHA:[id number]    Printed on:02/24/17 0905   Medication Information                      acetaminophen (TYLENOL) 500 MG tablet  Take 500 mg by mouth Every 4 (Four) Hours As Needed.             albuterol (PROAIR HFA) 108 (90 BASE) MCG/ACT inhaler  Inhale 2 puffs 3 (Three) Times a Day.             busPIRone (BUSPAR) 10 MG tablet  Take 10 mg by mouth 2 (Two) Times a Day.             citalopram (CeleXA) 40 MG tablet  Take 40 mg by mouth Daily.             desmopressin (DDAVP) 0.1 MG tablet  Take 0.1 mg by mouth 2 (Two) Times a Day.             dexlansoprazole (DEXILANT) 60 MG capsule  Take 60 mg by mouth 2 (Two) Times a Day.             EPINEPHrine (EPIPEN 2-MICHELET) 0.3 MG/0.3ML solution auto-injector injection  EpiPen 2-Michelet 0.3 MG/0.3ML KATHY; Patient Sig: EpiPen 2-Michelet 0.3 MG/0.3ML KATHY USE AS DIRECTED AS NEEDED; 0; 28-Jan-2014; Active; [employer]             gabapentin (NEURONTIN) 100 MG capsule  Take 1 po QID             Gel Base gel  Baclofen 5%, Cyclobenzaprine 4%, Ketoprofen 10%, Lidocaine 10%, Prilocaine 2%, - Apply 1-2 grams to affected area 3-4 times daily prn             levETIRAcetam (KEPPRA) 500 MG tablet  Take 2 tablets by mouth 2 (Two) Times a Day.             levocetirizine (XYZAL) 5 MG tablet  Take 5 mg by mouth Every Evening.             levothyroxine (SYNTHROID, LEVOTHROID) 50 MCG tablet  Take 50 mcg by mouth Daily.             mirtazapine (REMERON) 15 MG tablet  Take 15 mg by mouth every night.             montelukast (SINGULAIR) 10 MG tablet  Take 10 mg by mouth Daily.             pravastatin (PRAVACHOL) 40 MG tablet  Take 40 mg by mouth Daily.             ranitidine (ZANTAC) 300 MG tablet               rOPINIRole (REQUIP) 0.25 MG tablet  Take 1 tablet by  mouth Every Night. Take 1 hour before bedtime.             SUMAtriptan (IMITREX) 6 MG/0.5ML solution injection  Inject  under the skin 1 (One) Time As Needed.             tiZANidine (ZANAFLEX) 2 MG tablet  Take 1 tablet by mouth 3 (Three) Times a Day.             topiramate (TOPAMAX) 50 MG tablet  Take 50 mg by mouth Daily.                 Discharge Diet   Diet Instructions     Drink Plenty of Fluid Over Next 24 Hours                     Activity at Discharge  Activity Instructions     Elevate Head of Bed 30 Degrees for 24 Hours           No Strenuous Activity for 24 Hours                     Follow-up Appointments  Future Appointments  Date Time Provider Department Center   3/3/2017 12:00 PM Ever Samaniego MD [facility] None     Additional Instructions for the Follow-ups that You Need to Schedule     Discharge Follow Up (Specify Details)    As directed    Follow-up with Dr. Samaniego this week in office.   Follow Up:  1 Week                 Cammie Feliciano PA-C

## 2017-03-06 RX ORDER — ROPINIROLE 0.25 MG/1
TABLET, FILM COATED ORAL
Qty: 90 TABLET | Refills: 0 | Status: SHIPPED | OUTPATIENT
Start: 2017-03-06 | End: 2017-10-19

## 2017-03-22 ENCOUNTER — OFFICE VISIT (OUTPATIENT)
Dept: NEUROSURGERY | Facility: CLINIC | Age: 67
End: 2017-03-22

## 2017-03-22 VITALS — WEIGHT: 219 LBS | HEIGHT: 66 IN | TEMPERATURE: 97.2 F | BODY MASS INDEX: 35.2 KG/M2

## 2017-03-22 DIAGNOSIS — M51.36 DEGENERATIVE DISC DISEASE, LUMBAR: Primary | ICD-10-CM

## 2017-03-22 DIAGNOSIS — M48.062 LUMBAR STENOSIS WITH NEUROGENIC CLAUDICATION: ICD-10-CM

## 2017-03-22 DIAGNOSIS — M47.816 FACET ARTHRITIS OF LUMBAR REGION: ICD-10-CM

## 2017-03-22 PROCEDURE — 99213 OFFICE O/P EST LOW 20 MIN: CPT | Performed by: PHYSICIAN ASSISTANT

## 2017-03-22 RX ORDER — HYDROCODONE BITARTRATE AND ACETAMINOPHEN 5; 325 MG/1; MG/1
TABLET ORAL
COMMUNITY
Start: 2017-02-20 | End: 2017-05-04

## 2017-03-22 NOTE — PROGRESS NOTES
"Patient: Una Villarreal  : 1950  Chart #: [id number]    Date of Service: 2017    CHIEF COMPLAINT: 1.  Low back pain  2.  Post myelogram seizure    History of Present Illness Ms. Villarreal is a 66-year-old retired  who has a history of multi- level degenerative disc disease of the lumbar spine.  She has seen Dr. Alves and undergone extensive nonoperative measures. Her MRI demonstrated multilevel degenerative disc disease, diffuse facet arthropathy as well as some mild canal narrowing at L4 5.  Her symptoms raise specter of neurogenic claudication.  Dr. Samaniego recommended that the patient undergo lumbar CT myelography with upright images to see if there was indeed significant stenosis in excess of what was seen on her MRI scan.  The patient had the procedure on 2017 and unfortunately suffered a postprocedural grand mal seizure.  She has no prior history of seizure.  EEG was later obtained it did not reveal seizure activity. She was discharged on Keppra 1000mg BID.  Pt has had a few episodes of blanking out \"staring into space\".  Her back continues to bother her.  Dr. Samaniego had previously discussed surgery was not indicated. She is [place] outside [place] trip in  of this years and would like to know if medically she could go.     The following portions of the patient's history were reviewed and updated as appropriate: allergies, current medications, past family history, past medical history, past social history, past surgical history and problem list.    Review of Systems   Constitutional: Positive for fatigue. Negative for activity change, appetite change, chills, diaphoresis, fever and unexpected weight change.   HENT: Negative for congestion, dental problem, drooling, ear discharge, ear pain, facial swelling, hearing loss, mouth sores, nosebleeds, postnasal drip, rhinorrhea, sinus pressure, sneezing, sore throat, tinnitus, trouble swallowing and voice " change.    Eyes: Negative for photophobia, pain, discharge, redness, itching and visual disturbance.   Respiratory: Positive for shortness of breath. Negative for apnea, cough, choking, chest tightness, wheezing and stridor.    Cardiovascular: Negative for chest pain, palpitations and leg swelling.   Gastrointestinal: Negative for abdominal distention, abdominal pain, anal bleeding, blood in stool, constipation, diarrhea, nausea, rectal pain and vomiting.   Endocrine: Negative for cold intolerance, heat intolerance, polydipsia, polyphagia and polyuria.   Genitourinary: Negative for decreased urine volume, difficulty urinating, dysuria, enuresis, flank pain, frequency, genital sores, hematuria and urgency.   Musculoskeletal: Positive for back pain. Negative for arthralgias, gait problem, joint swelling, myalgias, neck pain and neck stiffness.   Skin: Negative for color change, pallor, rash and wound.   Allergic/Immunologic: Negative for environmental allergies, food allergies and immunocompromised state.   Neurological: Positive for dizziness and headaches. Negative for tremors, seizures, syncope, facial asymmetry, speech difficulty, weakness, light-headedness and numbness.   Hematological: Negative for adenopathy. Does not bruise/bleed easily.   Psychiatric/Behavioral: Positive for confusion. Negative for agitation, behavioral problems, decreased concentration, dysphoric mood, hallucinations, self-injury, sleep disturbance and suicidal ideas. The patient is not nervous/anxious and is not hyperactive.    All other systems reviewed and are negative.      Objective   Vital Signs: There were no vitals taken for this visit.  Physical Exam   Constitutional: She is oriented to person, place, and time. She appears well-developed and well-nourished. No distress.   HENT:   Head: Normocephalic and atraumatic.   Eyes: EOM are normal. Pupils are equal, round, and reactive to light.   Neck: Normal range of motion.    Musculoskeletal: Normal range of motion. She exhibits no edema.   Neurological: She is alert and oriented to person, place, and time. She has normal reflexes. No cranial nerve deficit. Coordination normal.   Psychiatric: She has a normal mood and affect. Her behavior is normal. Thought content normal.   Nursing note and vitals reviewed.      Assessment/Plan   Medical Decision Making: Ms. Villarreal has diffuse degenerative changes to her lumbar spine.  She has mild spinal stenosis.  Dr. Samaneigo does not feel that her findings are significant enough to explain her chronic severe back pain syndrome and has not recommended surgery.  With regard to her post myelogram seizure, we will wean her from her keppra.  She is taking 500 mg BID and will go to daily dosing for a couple weeks and then stop.  There is some question as to whether she has had some additional absence seizures since her discharge.  If she continues with these episodes of blanking out she will notify our office and we will refer her to neurology. We will see her as needed.                    Carline Reddy MA  Patient Care Team:  Zachary Redyd DO as PCP - General  Cheikh Alves MD as Referring Physician (Anesthesiology)

## 2017-05-04 ENCOUNTER — OFFICE VISIT (OUTPATIENT)
Dept: PAIN MEDICINE | Facility: CLINIC | Age: 67
End: 2017-05-04

## 2017-05-04 VITALS
OXYGEN SATURATION: 100 % | DIASTOLIC BLOOD PRESSURE: 87 MMHG | HEIGHT: 66 IN | RESPIRATION RATE: 18 BRPM | SYSTOLIC BLOOD PRESSURE: 147 MMHG | TEMPERATURE: 97.6 F

## 2017-05-04 DIAGNOSIS — M70.62 GREATER TROCHANTERIC BURSITIS OF BOTH HIPS: ICD-10-CM

## 2017-05-04 DIAGNOSIS — M48.062 LUMBAR STENOSIS WITH NEUROGENIC CLAUDICATION: ICD-10-CM

## 2017-05-04 DIAGNOSIS — M79.18 MYOFASCIAL PAIN: ICD-10-CM

## 2017-05-04 DIAGNOSIS — M47.816 LUMBAR FACET ARTHROPATHY: ICD-10-CM

## 2017-05-04 DIAGNOSIS — F10.21 HISTORY OF ALCOHOL DEPENDENCE (HCC): ICD-10-CM

## 2017-05-04 DIAGNOSIS — M51.26 LUMBAR DISC DISPLACEMENT WITHOUT MYELOPATHY: ICD-10-CM

## 2017-05-04 DIAGNOSIS — R53.81 PHYSICAL DECONDITIONING: ICD-10-CM

## 2017-05-04 DIAGNOSIS — G25.81 RESTLESS LEGS SYNDROME (RLS): ICD-10-CM

## 2017-05-04 DIAGNOSIS — M70.61 GREATER TROCHANTERIC BURSITIS OF BOTH HIPS: ICD-10-CM

## 2017-05-04 DIAGNOSIS — F32.89 OTHER DEPRESSION: ICD-10-CM

## 2017-05-04 DIAGNOSIS — M48.061 STENOSIS OF LATERAL RECESS OF LUMBAR SPINE: ICD-10-CM

## 2017-05-04 DIAGNOSIS — E66.3 OVERWEIGHT (BMI 25.0-29.9): ICD-10-CM

## 2017-05-04 PROBLEM — R56.9 GENERALIZED SEIZURE (HCC): Status: RESOLVED | Noted: 2017-02-21 | Resolved: 2017-05-04

## 2017-05-04 PROCEDURE — 99214 OFFICE O/P EST MOD 30 MIN: CPT | Performed by: ANESTHESIOLOGY

## 2017-05-08 ENCOUNTER — OUTSIDE FACILITY SERVICE (OUTPATIENT)
Dept: PAIN MEDICINE | Facility: CLINIC | Age: 67
End: 2017-05-08

## 2017-05-08 PROCEDURE — 99153 MOD SED SAME PHYS/QHP EA: CPT | Performed by: ANESTHESIOLOGY

## 2017-05-08 PROCEDURE — 99152 MOD SED SAME PHYS/QHP 5/>YRS: CPT | Performed by: ANESTHESIOLOGY

## 2017-05-08 PROCEDURE — 64483 NJX AA&/STRD TFRM EPI L/S 1: CPT | Performed by: ANESTHESIOLOGY

## 2017-05-08 PROCEDURE — 20610 DRAIN/INJ JOINT/BURSA W/O US: CPT | Performed by: ANESTHESIOLOGY

## 2017-08-15 ENCOUNTER — TELEPHONE (OUTPATIENT)
Dept: PAIN MEDICINE | Facility: CLINIC | Age: 67
End: 2017-08-15

## 2017-08-15 NOTE — TELEPHONE ENCOUNTER
Patient stated that her low back pain has returned. Pain returned about two weeks ago, she gets 2 and 1/2 to 3 months relief with the transforaminal epidural steroid injection. She would like to have injection instead of moving forward with spinal cord stimulator. She stated that she was in so much pain that she would be going to the ED for pain relief today.    POC   Follow up as needed. If patient fails to obtain relief then move forward with the spinal cord stimulator.

## 2017-08-24 ENCOUNTER — APPOINTMENT (OUTPATIENT)
Dept: LAB | Facility: HOSPITAL | Age: 67
End: 2017-08-24

## 2017-08-24 ENCOUNTER — OFFICE VISIT (OUTPATIENT)
Dept: PAIN MEDICINE | Facility: CLINIC | Age: 67
End: 2017-08-24

## 2017-08-24 VITALS
RESPIRATION RATE: 16 BRPM | SYSTOLIC BLOOD PRESSURE: 154 MMHG | BODY MASS INDEX: 35.99 KG/M2 | DIASTOLIC BLOOD PRESSURE: 83 MMHG | HEART RATE: 86 BPM | TEMPERATURE: 97.9 F | OXYGEN SATURATION: 97 % | WEIGHT: 223 LBS

## 2017-08-24 DIAGNOSIS — F32.89 OTHER DEPRESSION: ICD-10-CM

## 2017-08-24 DIAGNOSIS — F10.21 HISTORY OF ALCOHOL DEPENDENCE (HCC): ICD-10-CM

## 2017-08-24 DIAGNOSIS — G25.81 RESTLESS LEGS SYNDROME (RLS): ICD-10-CM

## 2017-08-24 DIAGNOSIS — M48.061 STENOSIS OF LATERAL RECESS OF LUMBAR SPINE: ICD-10-CM

## 2017-08-24 DIAGNOSIS — Z01.818 PRE-PROCEDURAL EXAMINATION: Primary | ICD-10-CM

## 2017-08-24 DIAGNOSIS — R53.81 PHYSICAL DECONDITIONING: ICD-10-CM

## 2017-08-24 DIAGNOSIS — E66.8 MODERATE OBESITY: ICD-10-CM

## 2017-08-24 DIAGNOSIS — M47.816 LUMBAR FACET ARTHROPATHY: ICD-10-CM

## 2017-08-24 DIAGNOSIS — M79.18 MYOFASCIAL PAIN: ICD-10-CM

## 2017-08-24 DIAGNOSIS — M48.062 LUMBAR STENOSIS WITH NEUROGENIC CLAUDICATION: ICD-10-CM

## 2017-08-24 DIAGNOSIS — Z51.89 ENCOUNTER FOR OTHER SPECIFIED AFTERCARE: ICD-10-CM

## 2017-08-24 DIAGNOSIS — M70.61 GREATER TROCHANTERIC BURSITIS OF BOTH HIPS: ICD-10-CM

## 2017-08-24 DIAGNOSIS — M51.36 DDD (DEGENERATIVE DISC DISEASE), LUMBAR: ICD-10-CM

## 2017-08-24 DIAGNOSIS — M70.62 GREATER TROCHANTERIC BURSITIS OF BOTH HIPS: ICD-10-CM

## 2017-08-24 LAB
APTT PPP: 28.5 SECONDS (ref 24–31)
BASOPHILS # BLD AUTO: 0.02 10*3/MM3 (ref 0–0.2)
BASOPHILS NFR BLD AUTO: 0.4 % (ref 0–1)
DEPRECATED RDW RBC AUTO: 51.1 FL (ref 37–54)
EOSINOPHIL # BLD AUTO: 0.11 10*3/MM3 (ref 0–0.3)
EOSINOPHIL NFR BLD AUTO: 2 % (ref 0–3)
ERYTHROCYTE [DISTWIDTH] IN BLOOD BY AUTOMATED COUNT: 14.2 % (ref 11.3–14.5)
HCT VFR BLD AUTO: 35.8 % (ref 34.5–44)
HGB BLD-MCNC: 11.5 G/DL (ref 11.5–15.5)
IMM GRANULOCYTES # BLD: 0.01 10*3/MM3 (ref 0–0.03)
IMM GRANULOCYTES NFR BLD: 0.2 % (ref 0–0.6)
INR PPP: 0.94
LYMPHOCYTES # BLD AUTO: 1.89 10*3/MM3 (ref 0.6–4.8)
LYMPHOCYTES NFR BLD AUTO: 34.4 % (ref 24–44)
MCH RBC QN AUTO: 31.4 PG (ref 27–31)
MCHC RBC AUTO-ENTMCNC: 32.1 G/DL (ref 32–36)
MCV RBC AUTO: 97.8 FL (ref 80–99)
MONOCYTES # BLD AUTO: 0.38 10*3/MM3 (ref 0–1)
MONOCYTES NFR BLD AUTO: 6.9 % (ref 0–12)
NEUTROPHILS # BLD AUTO: 3.09 10*3/MM3 (ref 1.5–8.3)
NEUTROPHILS NFR BLD AUTO: 56.1 % (ref 41–71)
PLATELET # BLD AUTO: 234 10*3/MM3 (ref 150–450)
PMV BLD AUTO: 9.4 FL (ref 6–12)
PROTHROMBIN TIME: 10.2 SECONDS (ref 9.6–11.5)
RBC # BLD AUTO: 3.66 10*6/MM3 (ref 3.89–5.14)
WBC NRBC COR # BLD: 5.5 10*3/MM3 (ref 3.5–10.8)

## 2017-08-24 PROCEDURE — 36415 COLL VENOUS BLD VENIPUNCTURE: CPT

## 2017-08-24 PROCEDURE — 85025 COMPLETE CBC W/AUTO DIFF WBC: CPT | Performed by: ANESTHESIOLOGY

## 2017-08-24 PROCEDURE — 99214 OFFICE O/P EST MOD 30 MIN: CPT | Performed by: ANESTHESIOLOGY

## 2017-08-24 PROCEDURE — 85730 THROMBOPLASTIN TIME PARTIAL: CPT | Performed by: ANESTHESIOLOGY

## 2017-08-24 PROCEDURE — 85610 PROTHROMBIN TIME: CPT | Performed by: ANESTHESIOLOGY

## 2017-08-24 RX ORDER — OXYCODONE HYDROCHLORIDE AND ACETAMINOPHEN 5; 325 MG/1; MG/1
TABLET ORAL
COMMUNITY
Start: 2017-07-14 | End: 2017-09-28

## 2017-08-24 RX ORDER — HYDROCODONE BITARTRATE AND ACETAMINOPHEN 5; 325 MG/1; MG/1
TABLET ORAL
COMMUNITY
Start: 2017-06-27 | End: 2017-09-28

## 2017-08-24 RX ORDER — TRIAMCINOLONE ACETONIDE 5 MG/G
OINTMENT TOPICAL
COMMUNITY
Start: 2017-08-09 | End: 2017-08-24

## 2017-08-24 NOTE — PROGRESS NOTES
"CHIEF COMPLAINT: \"I did great for 3 months after my epidural.\"    BRIEF HISTORY: Mrs. Una Villarreal is a 67 y.o. female, who returns to the clinic for evaluation of recurrent lower back pain, bilateral hip pain, and neurogenic claudication.  Patient underwent bilateral L4-L5 transforaminal epidural steroid injection on May 08, 2017 and reports that she experienced almost complete relief and functional improvement for approximately 3 months.  Subsequently, pain progressively recurred. Patient has been found not to be a surgical candidate. She would like to have an injection or moving forward with spinal cord stimulator. She stated that she was in so much pain that she went to the ED for pain relief two weeks ago.  Current pain level: 4/10  Pain level ranges from 4/10 to 10/10   Patient complains of pain in her lower back  Patient complains of constant pain with intermittent exacerbation, described as aching, throbbing and tight band sensation.   Radiation of pain: Into the gluteal region.   Pain increases with standing longer than 2-3 minutes and ambulating more than 2 minutes.  She describes severe neurogenic claudication with her legs feeling heavy.  Pain decreases with sitting, lying and analgesics.   Patient denies pain, numbness or weakness in the lower extremities.   Patient denies any new bladder or bowel problems.   Patient denies difficulties with her balance or recent falls.    Review of previous therapies and additional medical records:  Una Villarreal has already failed the following measures, including:   Conservative measures: oral analgesics, opioids, topical analgesics, massage, physical therapy, ice, heat, chiropractic therapy and TENs   Interventional: As referenced above  Surgical: No previous surgery  Una Villarreal underwent neurosurgical consultation with Dr. Ever Samaniego and was found not to be a surgical candidate.  In terms of current analgesics, Una Villarreal takes: Tizanidine, " gabapentin, Tylenol, and can compounded cream without side effects  I have reviewed her Justice Report #[id number] consistent with medication reconciliation.    Diagnostic Studies:   MRI LUMBAR SPINE WO CONTRAST- 09/20/2016.  I have reviewed images of her MRI, which revealed small broad-based disc protrusions at L3-L4 and at L4-L5.  Increased fluid signal at the lumbar facet joints laterally.  Lumbar facet joint hypertrophy.  This causes some lateral recess stenosis.  There is no high-grade spinal stenosis. The conus medullaris is normal. The vertebral bodies are normal. There is no disc space narrowing.CT HEAD WO CONTRAST- 02/21/2017. Marked amount of myelographic contrast in the basal cisterns and subarachnoid spaces of the brain. Marked contrast is identified along the subarachnoid basal cisterns, prepontine cistern, interpeduncular cistern, and along the sylvian fissures. Myelographic  contrast is noted over the falx tentorium and over the convexities of the brain as well as in the interhemispheric fissures. This is a marked amount of myelographic contrast that has migrated into the subarachnoid spaces of the brain.  There is no evidence of intraaxial cerebral mass, hemorrhage or extracerebral collection other than the myelographic contrast. There is no midline shift or mass effect. The fourth ventricle appears to be patent and midline. Ocular lens and conal contents are normal. There is no impingement at the foramen magnum. Ventricular system is normal. The extended window datasets demonstrate changes of a right temporal craniotomy and the patient does have some encephalomalacia in the anterior limits of the right middle cranial fossa. Please correlate with surgical history. The mastoids and paranasal sinuses are clear with the exception of opacification in the right sphenoid sinus which could relate to the patient's previous surgery.  CT LUMBAR SPINE WITHOUT CONTRAST-02/21/2017:  Normal anatomic alignment of the  lumbar spine is preserved. The conus medullaris terminates posterior to the T12-L1 level. There is no intrathecal mass. Normal anatomic alignment of the lumbar spine is preserved. The facet joints are normal.  AXIAL IMAGES:  L1-L2: The disc space is normal. There is no neuroforaminal narrowing or central canal stenosis.  L2-L3: The disc space is normal. There is no significant neuroforaminal narrowing or central canal stenosis.  L3-L4: A minimal disc bulge lateralizes towards the left. The facet joints are normal. There is mild left neuroforaminal narrowing. There is no significant central canal stenosis.    L4-L5: A mild symmetric disc bulge is present. The facet joints are normal. There is no significant neuroforaminal narrowing or central canal stenosis.  L5-S1: The disc space is normal. There is no significant neuroforaminal narrowing or central canal stenosis. The facet joints are grossly unremarkable.  IR MYELOGRAM LUMBAR SPINE-, XR SPINE LUMBAR FLEX AND EXT- L2-L3, L3-L4, and L4-5 levels there is mild anterior impression on thecal sac  At the L4-L5 level there is effacement of the nerve root sleeves bilaterally  X-ray Lumbar Spine with flexion and extension on 04/27/2015 revealed minimal degenerative change and no evidence of fracture or malalignment.   MRI of the lumbar spine on 12/03/2010 revealed lumbar facet hypertrophy at L3-L4, L4-L5, and L5-S1. There is no significant central canal stenosis. Ligamentum flavum hypertrophy measures 5 mm at L3-L4 and L4-L5. Degenerative disc disease at L3-L4, and L4-L5.    Review of Systems   HENT: Positive for congestion and sinus pressure.    Respiratory: Positive for cough.    Musculoskeletal: Positive for arthralgias, back pain, gait problem, joint swelling and myalgias.   Allergic/Immunologic: Positive for environmental allergies.   Neurological: Positive for headaches. Negative for numbness.   All other systems reviewed and are negative.     The following portions  of the patient's history were reviewed and updated as appropriate: problem list, past medical history, past surgery history, social history, family history, medications, and allergies     /83 (BP Location: Right arm, Patient Position: Sitting, Cuff Size: Large Adult)  Pulse 86  Temp 97.9 °F (36.6 °C)  Resp 16  Wt 223 lb (101 kg)  SpO2 97%  BMI 35.99 kg/m2      Physical Exam   Neurologic Exam  Constitutional: General appearance: No acute distress, well appearing and well nourished. Appears healthy, overweight, well hydrated and appearance reflects stated age.   Head and face: Normal. Palpation of the face and sinuses: No sinus tenderness.   Eyes Conjunctiva and lids: No swelling, erythema or discharge. Pupils: Equal, round, reactive to light.   Neck: Supple, symmetric, trachea midline, no masses.   Pulmonary Respiratory effort: No increased work of breathing or signs of respiratory distress. Auscultation of lungs: Clear to auscultation.   Cardiovascular Auscultation of heart: Normal rate and rhythm, normal S1 and S2, no murmurs. Peripheral vascular exam: Normal. Examination of extremities for edema and/or varicosities: Normal.   Abdomen: Non-tender, no masses. The abdomen was obese. Bowel sounds were normal. The abdomen was soft and nontender. No masses palpated.   Musculoskeletal Gait and station: Normal. Gait evaluation demonstrated antalgia bilaterally. The range of motion of the lumbar spine is essentially full and without pain. Lumbar facet joint loading maneuvers are negative. Gary and Gaenslen's tests are negative.The range of motion of the hip joints is full and without pain. Palpation of the greater trochanters reproduces pain. Right knee ROM: Extension 0 degrees, flexion 110 degrees. Significant crepitus. Muscle strength/tone: Normal. Trigger points at the left multifidus, left psoas, left gluteus medius, and left QL.  Skin and subcutaneous tissue: Normal without rashes or lesions.    Neurologic   Cranial nerves: Cranial nerves II-XII intact.   Cortical function: Normal mental status.   Reflexes: 2+ and symmetric. Deep tendon reflexes: 2+ right biceps, 2+ left biceps, 2+ right patella, 2+ left patella, 2+ right ankle jerk and 2+ left ankle jerk. Superficial/Primitive Reflexes: primitive reflexes were absent. Straight leg raising test is negative. Femoral stretch sign is negative.   Sensation: No sensory loss. Sensory exam: intact to light touch, intact pain and temperature sensation, intact vibration sensation and normal proprioception.   Coordination: Normal finger to nose and heel to shin. Coordination: normal balance and negative Romberg's sign.   Psychiatric: Judgment and insight: Normal. Orientation to person, place, and time: Normal. Recent and remote memory: Intact. Mood and affect: Normal.       ASSESSMENT:   1. Lumbar stenosis with neurogenic claudication    2. Stenosis of lateral recess of lumbar spine    3. Greater trochanteric bursitis of both hips    4. DDD (degenerative disc disease), lumbar    5. Lumbar facet arthropathy/lumbar spondylosis without myelopathy    6. Myofascial pain    7. Restless legs syndrome (RLS)    8. History of alcohol dependence    9. Moderate obesity    10. Other depression    11. Physical deconditioning      PLAN: Patient continues dealing with chronic pain condition and residual symptoms. I have reviewed all available patient's medical records as well as previous therapies as referenced above under history of present illness. Patient has been found not to be a surgical candidate.  Therefore, have proposed the following plan:  1. Interventional pain management measures: Patient will be scheduled for a spinal cord stimulator trial with [employer].  Patient has already received a DVDs with didactic information regarding spinal cord stimulation therapies. Patient has already obtained psychological clearance for spinal cord stimulation by Dr. Cordova.  2.  Long-term rehabilitation efforts:  A. Patient will start a comprehensive physical therapy program for reconditioning, water therapy, therapeutic exercise, core strengthening, gait and balance training, neurodynamics, desensitization techniques, ultrasound, ASTYM, dry needling, and myofascial release once her pain is under control  B. Continue efforts at weight loss  C. Contrast therapy  D. Patient has been instructed regarding universal fall precautions, such as;  · Using a cane or a rolling walker at all times for ambulation  · Removing all area rugs and coffee tables to create a safe environment at home  · Using a shower transfer bench  · Using walk-in shower and having shower safety bars installed  3.  Pharmacological measures:  a.  Continue lidocaine 10%, prilocaine 2%, baclofen 5%, cyclobenzaprine 4%, and ketoprofen 10% cream, apply 1 to 2 grams of cream to the affected areas every 4 to 6 hours as needed.  b.  Continue tizanidine 2 mg ½ to 1 tablet three times a day as needed for muscle spasms.   c.  Take Tylenol 500 mg four times daily as needed for mild to moderate breakthrough pain  d.  Continue Requip 0.25 mg 1-2 tablets at bedtime  e.  Continue gabapentin 100 mg 4 times a day  4. The patient has been instructed to contact my office with any questions or difficulties. The patient understands the plan and agrees to proceed accordingly.      Patient Care Team:  Zachary Reddy DO as PCP - General  Rashid Cardona MD as PCP - Claims Attributed  Cheikh Alves MD as Referring Physician (Anesthesiology)  Ever Samaniego MD as Surgeon (Neurosurgery)  Dee Mccabe PA-C as Physician Assistant (Physician Assistant)  Armond Anderson PT as Physical Therapist (Physical Therapy)     New Medications Ordered This Visit   Medications   • oxyCODONE-acetaminophen (PERCOCET) 5-325 MG per tablet   • HYDROcodone-acetaminophen (NORCO) 5-325 MG per tablet         No future appointments.      Cheikh BARFIELD  MD RICHIE Alves Dragon/Transcription disclaimer:  Much of this encounter note is an electronic transcription of spoken language to printed text. Electronic transcription of spoken language may permit erroneous, or at times, nonsensical words or phrases to be inadvertently transcribed. Although I have reviewed the note for such errors, some may still exist.

## 2017-09-25 ENCOUNTER — OUTSIDE FACILITY SERVICE (OUTPATIENT)
Dept: PAIN MEDICINE | Facility: CLINIC | Age: 67
End: 2017-09-25

## 2017-09-25 PROCEDURE — 95972 ALYS CPLX SP/PN NPGT W/PRGRM: CPT | Performed by: ANESTHESIOLOGY

## 2017-09-25 PROCEDURE — 99152 MOD SED SAME PHYS/QHP 5/>YRS: CPT | Performed by: ANESTHESIOLOGY

## 2017-09-25 PROCEDURE — 63650 IMPLANT NEUROELECTRODES: CPT | Performed by: ANESTHESIOLOGY

## 2017-09-26 ENCOUNTER — TELEPHONE (OUTPATIENT)
Dept: PAIN MEDICINE | Facility: CLINIC | Age: 67
End: 2017-09-26

## 2017-09-26 NOTE — TELEPHONE ENCOUNTER
Patient stated she was feeling good. Did use ice. She had talked to Lo the night before.  9/26/17    Patient did not answer LVM

## 2017-09-27 NOTE — PROGRESS NOTES
"Chief Complaint: \"I did great. I want to keep this stimulator.\"    Brief History: Mrs. Una Villarreal is a 67 y.o. female, who returns to the clinic for possible spinal cord stimulator reprogramming and removal of spinal cord stimulator trial leads placed on 09/25/2017. Ms. Una Villarreal reports more than 85% pain relief of her chronic lower back, hip pain and claudication along with remarkable functional improvement with the use of her stimulator device. Patient reports significant relief of her previously severe neurogenic claudication. She was able to walk for hours at [employer] without experiencing pain. My RLS was gone during the trial. Pain level is rated as 0/10 with the stimulator \"turned on.”   Patient level ranges from 0/10 to 1/10 with the use of the spinal cord stimulator.    Patient did not take additional analgesics throughout her spinal cord stimulator trial. She has remained afebrile throughout the trial. Dressings are dry and intact. Patient denies any complications related to the procedure, any new symptoms or any new bladder or bowel problems. Patient denies   pain, numbness and weakness in the lower extremities. Patient denies  any new bladder or bowel problems.   Patient is very satisfied with the trial and would like to move forward with implantation of a spinal cord stimulator device.     Review of previous therapies and additional medical records:  Una Villarreal has already failed the following measures, including:   Conservative measures: oral analgesics, opioids, topical analgesics, massage, physical therapy, ice, heat, chiropractic therapy and TENs   Interventional: As referenced above  Surgical: No previous surgery  Una Villarreal underwent neurosurgical consultation with Dr. Ever Samaniego and was found not to be a surgical candidate.  In terms of current analgesics, Una Villarreal takes: Tizanidine, gabapentin, Tylenol, and can compounded cream without side effects  I have " reviewed her [facility] Report #[id number] consistent with medication reconciliation.     Diagnostic Studies:   MRI LUMBAR SPINE WO CONTRAST- 09/20/2016.  I have reviewed images of her MRI, which revealed small broad-based disc protrusions at L3-L4 and at L4-L5.  Increased fluid signal at the lumbar facet joints laterally.  Lumbar facet joint hypertrophy.  This causes some lateral recess stenosis.  There is no high-grade spinal stenosis. The conus medullaris is normal. The vertebral bodies are normal. There is no disc space narrowing.CT HEAD WO CONTRAST- 02/21/2017. Marked amount of myelographic contrast in the basal cisterns and subarachnoid spaces of the brain. Marked contrast is identified along the subarachnoid basal cisterns, prepontine cistern, interpeduncular cistern, and along the sylvian fissures. Myelographic  contrast is noted over the falx tentorium and over the convexities of the brain as well as in the interhemispheric fissures. This is a marked amount of myelographic contrast that has migrated into the subarachnoid spaces of the brain.  There is no evidence of intraaxial cerebral mass, hemorrhage or extracerebral collection other than the myelographic contrast. There is no midline shift or mass effect. The fourth ventricle appears to be patent and midline. Ocular lens and conal contents are normal. There is no impingement at the foramen magnum. Ventricular system is normal. The extended window datasets demonstrate changes of a right temporal craniotomy and the patient does have some encephalomalacia in the anterior limits of the right middle cranial fossa. Please correlate with surgical history. The mastoids and paranasal sinuses are clear with the exception of opacification in the right sphenoid sinus which could relate to the patient's previous surgery.  CT LUMBAR SPINE WITHOUT CONTRAST-02/21/2017:  Normal anatomic alignment of the lumbar spine is preserved. The conus medullaris terminates posterior to  "the T12-L1 level. There is no intrathecal mass. Normal anatomic alignment of the lumbar spine is preserved. The facet joints are normal.  AXIAL IMAGES:  L1-L2: The disc space is normal. There is no neuroforaminal narrowing or central canal stenosis.  L2-L3: The disc space is normal. There is no significant neuroforaminal narrowing or central canal stenosis.  L3-L4: A minimal disc bulge lateralizes towards the left. The facet joints are normal. There is mild left neuroforaminal narrowing. There is no significant central canal stenosis.    L4-L5: A mild symmetric disc bulge is present. The facet joints are normal. There is no significant neuroforaminal narrowing or central canal stenosis.  L5-S1: The disc space is normal. There is no significant neuroforaminal narrowing or central canal stenosis. The facet joints are grossly unremarkable.  IR MYELOGRAM LUMBAR SPINE-, XR SPINE LUMBAR FLEX AND EXT- L2-L3, L3-L4, and L4-5 levels there is mild anterior impression on thecal sac  At the L4-L5 level there is effacement of the nerve root sleeves bilaterally  X-ray Lumbar Spine with flexion and extension on 04/27/2015 revealed minimal degenerative change and no evidence of fracture or malalignment.   MRI of the lumbar spine on 12/03/2010 revealed lumbar facet hypertrophy at L3-L4, L4-L5, and L5-S1. There is no significant central canal stenosis. Ligamentum flavum hypertrophy measures 5 mm at L3-L4 and L4-L5. Degenerative disc disease at L3-L4, and L4-L5.    The following portions of the patient's history were reviewed and updated as appropriate: problem list, past medical history, past surgery history, social history, family history, medications, and allergies    Review of Systems   Musculoskeletal: Positive for back pain and gait problem.   All other systems reviewed and are negative.    /93 (BP Location: Left arm, Patient Position: Sitting)  Pulse 77  Temp 97.2 °F (36.2 °C) (Temporal Artery )   Resp 20  Ht 66\" " (167.6 cm)  Wt 235 lb (107 kg)  SpO2 97%  BMI 37.93 kg/m2      Physical Exam   Neurologic Exam  Constitutional: General appearance: No acute distress, well appearing and well nourished. Appears healthy, overweight, well hydrated and appearance reflects stated age.   Head and face: Normal. Palpation of the face and sinuses: No sinus tenderness.   Eyes Conjunctiva and lids: No swelling, erythema or discharge. Pupils: Equal, round, reactive to light.   Neck: Supple, symmetric, trachea midline, no masses.   Pulmonary Respiratory effort: No increased work of breathing or signs of respiratory distress. Auscultation of lungs: Clear to auscultation.   Cardiovascular Auscultation of heart: Normal rate and rhythm, normal S1 and S2, no murmurs. Peripheral vascular exam: Normal. Examination of extremities for edema and/or varicosities: Normal.   Abdomen: Non-tender, no masses. The abdomen was obese. Bowel sounds were normal. The abdomen was soft and nontender. No masses palpated.   Musculoskeletal Gait and station: Normal. Gait evaluation demonstrated antalgia bilaterally. The range of motion of the lumbar spine is essentially full and without pain. Lumbar facet joint loading maneuvers are negative. Gary and Gaenslen's tests are negative.The range of motion of the hip joints is full and without pain. Palpation of the greater trochanters reproduces pain. Right knee ROM: Extension 0 degrees, flexion 110 degrees. Significant crepitus. Muscle strength/tone: Normal. Trigger points at the left multifidus, left psoas, left gluteus medius, and left QL.  Skin and subcutaneous tissue: Normal without rashes or lesions.   Neurologic   Cranial nerves: Cranial nerves II-XII intact.   Cortical function: Normal mental status.   Reflexes: 2+ and symmetric. Deep tendon reflexes: 2+ right biceps, 2+ left biceps, 2+ right patella, 2+ left patella, 2+ right ankle jerk and 2+ left ankle jerk. Superficial/Primitive Reflexes: primitive reflexes  were absent. Straight leg raising test is negative. Femoral stretch sign is negative.   Sensation: No sensory loss. Sensory exam: intact to light touch, intact pain and temperature sensation, intact vibration sensation and normal proprioception.   Coordination: Normal finger to nose and heel to shin. Coordination: normal balance and negative Romberg's sign.   Psychiatric: Judgment and insight: Normal. Orientation to person, place, and time: Normal. Recent and remote memory: Intact. Mood and affect: Normal.       PROCEDURES:   Procedure #1: Analysis of the spinal cord stimulator device with complex spinal cord stimulator reprogramming   Patient used the [employer] Jude spinal cord stimulator device 100% of the time since initiation of the trial phase. The spinal cord stimulator device was reprogrammed under my direct supervision by adjusting electrode polarities, amplitude, pulse width, pulse rate, BurstDR, micro-dosing, for a total of two programs, in the following fashion;  Program ONE (Best Program):    Electrode polarities: 12-, 3+, 9-, 11+  Amplitude: 1.7-2.9 mA     Pulse width: 500 mcs  Rate: 50 Hz  Patient experienced significant pain relief with coverage with pleasant paresthesia in all areas of chronic pain.  Walking ability test performed revealing resolution of previously severe neurogenic claudication.    Time spent reprogramming: 15 minutes  A copy of the telemetry report will be scanned in the patient's chart.    Procedure #2: Removal of spinal cord stimulator trial leads.   Two leads were removed with tips intact. There is no erythema, drainage, or fluid accumulation at the site. The area was cleansed with chlorhexidine.  A small Covaderm was applied.     ASSESSMENT:   1. Lumbar stenosis with neurogenic claudication    2. Stenosis of lateral recess of lumbar spine    3. Greater trochanteric bursitis of both hips    4. DDD (degenerative disc disease), lumbar    5. Lumbar facet arthropathy/lumbar spondylosis  without myelopathy    6. Myofascial pain    7. Restless legs syndrome (RLS)    8. History of alcohol dependence    9. Moderate obesity    10. Other depression    11. Physical deconditioning      PLAN: Patient's chronic pain condition essentially resolved during her SCS trial. Therefore, have proposed the following plan:  1. Referral to Dr. Samaniego in consultation for implantation of a spinal cord stimulator device. I have recommended [employer] Trung Penta Lead with the top electrodes projecting at the level of the superior endplate of the T7 vertebral level. I have recommended [employer] Trung Proclaim 7 IPG Non-Rechargeable (Full Body MRI compatible). Patient will follow-up with me thereafter.   2. Recommendations for infection control prior to surgery, as follows:  · Start over-the-counter Hibiclens showers daily 5 days before surgery  · Chlorhexidine towelettes (given at time of Pre-Admission Testing appointment) night before and day of surgery  3. Diagnostics: MRI of the thoracic spine without contrast to assess patency of thoracic epidural space for placement of surgical leads.  4. Long-term rehabilitation efforts:  A. Patient will start a comprehensive physical therapy program for reconditioning, water therapy, therapeutic exercise, core strengthening, gait and balance training, neurodynamics, desensitization techniques, ultrasound, ASTYM, dry needling, and myofascial release 4-6 weeks after SCS implant  B. Continue efforts at weight loss  C. Contrast therapy  D. Patient has been instructed regarding universal fall precautions, such as;  · Using a cane or a rolling walker at all times for ambulation  · Removing all area rugs and coffee tables to create a safe environment at home  · Using a shower transfer bench  · Using walk-in shower and having shower safety bars installed  5.  Pharmacological measures:  a.  Continue lidocaine 10%, prilocaine 2%, baclofen 5%, cyclobenzaprine 4%, and ketoprofen 10% cream, apply 1 to 2  grams of cream to the affected areas every 4 to 6 hours as needed.  b.  Continue tizanidine 2 mg ½ to 1 tablet three times a day as needed for muscle spasms.   c.  Take Tylenol 500 mg four times daily as needed for mild to moderate breakthrough pain  d.  Continue Requip 0.25 mg 1-2 tablets at bedtime  e.  Continue gabapentin 100 mg 4 times a day  6. The patient has been instructed to contact my office with any questions or difficulties. The patient understands the plan and agrees to proceed accordingly.    Patient Care Team:  Zachary Reddy DO as PCP - General  Rashid Cardona MD as PCP - Claims Attributed  Cheikh Alves MD as Consulting Physician (Anesthesiology)  Ever Samaniego MD as Consulting Physician (Neurosurgery)  Dee Mccabe PA-C as Physician Assistant (Physician Assistant)  Armond Anderson PT as Physical Therapist (Physical Therapy)     No orders of the defined types were placed in this encounter.        No future appointments.      Cheikh Alves MD      EMR Dragon/Transcription disclaimer:  Much of this encounter note is an electronic transcription of spoken language to printed text. Electronic transcription of spoken language may permit erroneous, or at times, nonsensical words or phrases to be inadvertently transcribed. Although I have reviewed the note for such errors, some may still exist.

## 2017-09-28 ENCOUNTER — OFFICE VISIT (OUTPATIENT)
Dept: PAIN MEDICINE | Facility: CLINIC | Age: 67
End: 2017-09-28

## 2017-09-28 VITALS
WEIGHT: 235 LBS | OXYGEN SATURATION: 97 % | TEMPERATURE: 97.2 F | SYSTOLIC BLOOD PRESSURE: 154 MMHG | HEART RATE: 77 BPM | DIASTOLIC BLOOD PRESSURE: 93 MMHG | HEIGHT: 66 IN | RESPIRATION RATE: 20 BRPM | BODY MASS INDEX: 37.77 KG/M2

## 2017-09-28 DIAGNOSIS — M47.816 LUMBAR FACET ARTHROPATHY: ICD-10-CM

## 2017-09-28 DIAGNOSIS — M48.062 LUMBAR STENOSIS WITH NEUROGENIC CLAUDICATION: Primary | ICD-10-CM

## 2017-09-28 DIAGNOSIS — M70.61 GREATER TROCHANTERIC BURSITIS OF BOTH HIPS: ICD-10-CM

## 2017-09-28 DIAGNOSIS — F10.21 HISTORY OF ALCOHOL DEPENDENCE (HCC): ICD-10-CM

## 2017-09-28 DIAGNOSIS — F32.89 OTHER DEPRESSION: ICD-10-CM

## 2017-09-28 DIAGNOSIS — M70.62 GREATER TROCHANTERIC BURSITIS OF BOTH HIPS: ICD-10-CM

## 2017-09-28 DIAGNOSIS — M51.36 DDD (DEGENERATIVE DISC DISEASE), LUMBAR: ICD-10-CM

## 2017-09-28 DIAGNOSIS — M48.061 STENOSIS OF LATERAL RECESS OF LUMBAR SPINE: ICD-10-CM

## 2017-09-28 DIAGNOSIS — M48.062 LUMBAR STENOSIS WITH NEUROGENIC CLAUDICATION: ICD-10-CM

## 2017-09-28 DIAGNOSIS — M79.18 MYOFASCIAL PAIN: ICD-10-CM

## 2017-09-28 DIAGNOSIS — R53.81 PHYSICAL DECONDITIONING: ICD-10-CM

## 2017-09-28 DIAGNOSIS — Z01.818 PRE-OP TESTING: ICD-10-CM

## 2017-09-28 DIAGNOSIS — E66.8 MODERATE OBESITY: ICD-10-CM

## 2017-09-28 DIAGNOSIS — G25.81 RESTLESS LEGS SYNDROME (RLS): ICD-10-CM

## 2017-09-28 PROCEDURE — 99024 POSTOP FOLLOW-UP VISIT: CPT | Performed by: ANESTHESIOLOGY

## 2017-09-28 PROCEDURE — 95972 ALYS CPLX SP/PN NPGT W/PRGRM: CPT | Performed by: ANESTHESIOLOGY

## 2017-09-28 RX ORDER — MELOXICAM 15 MG/1
TABLET ORAL
COMMUNITY
Start: 2017-09-18 | End: 2017-09-28

## 2017-09-30 ENCOUNTER — HOSPITAL ENCOUNTER (OUTPATIENT)
Dept: MRI IMAGING | Facility: HOSPITAL | Age: 67
Discharge: HOME | End: 2017-09-30
Attending: ANESTHESIOLOGY | Admitting: ANESTHESIOLOGY

## 2017-09-30 PROCEDURE — 72146 MRI CHEST SPINE W/O DYE: CPT

## 2017-10-11 ENCOUNTER — OFFICE VISIT (OUTPATIENT)
Dept: NEUROSURGERY | Facility: CLINIC | Age: 67
End: 2017-10-11

## 2017-10-11 ENCOUNTER — PREP FOR SURGERY (OUTPATIENT)
Dept: OTHER | Facility: HOSPITAL | Age: 67
End: 2017-10-11

## 2017-10-11 VITALS — WEIGHT: 222 LBS | HEIGHT: 66 IN | BODY MASS INDEX: 35.68 KG/M2 | TEMPERATURE: 97.6 F

## 2017-10-11 DIAGNOSIS — M47.816 FACET ARTHRITIS OF LUMBAR REGION: Primary | ICD-10-CM

## 2017-10-11 DIAGNOSIS — M47.816 FACET ARTHRITIS OF LUMBAR REGION: ICD-10-CM

## 2017-10-11 DIAGNOSIS — M51.36 DEGENERATIVE DISC DISEASE, LUMBAR: Primary | ICD-10-CM

## 2017-10-11 DIAGNOSIS — M48.061 SPINAL STENOSIS, LUMBAR REGION, WITHOUT NEUROGENIC CLAUDICATION: ICD-10-CM

## 2017-10-11 PROCEDURE — 99213 OFFICE O/P EST LOW 20 MIN: CPT | Performed by: NEUROLOGICAL SURGERY

## 2017-10-11 RX ORDER — GABAPENTIN 400 MG/1
400 CAPSULE ORAL 4 TIMES DAILY
COMMUNITY
End: 2017-10-19

## 2017-10-11 RX ORDER — SODIUM CHLORIDE 0.9 % (FLUSH) 0.9 %
1-10 SYRINGE (ML) INJECTION AS NEEDED
Status: CANCELLED | OUTPATIENT
Start: 2017-10-11

## 2017-10-11 RX ORDER — CEFAZOLIN SODIUM 2 G/100ML
2 INJECTION, SOLUTION INTRAVENOUS ONCE
Status: CANCELLED | OUTPATIENT
Start: 2017-10-11 | End: 2017-10-11

## 2017-10-11 NOTE — H&P
Patient: Una Villarreal  : 1950     Primary Care Provider: Zachary Reddy DO     Requesting Provider: As above           History     Chief Complaint: Low back and bilateral leg pain.     History of Present Illness: The patient is a 7-year-old retired  who describes a 5-6-year history of low back pain.  She denies any inciting or precipitating events.  If she stands or walks too long she gets a burning in her thighs and her legs will get heavy.   symptoms are somewhat greater on the left and do not extend below the knee.  She reports no bowel or bladder dysfunction.  She's been treated with Neurontin.  She has undergone multiple rhizotomies.  She's had a single epidural injection done.  She has also been treated with physical therapy.  Sometimes sitting is helpful.  She can lie in certain positions and feels better but other positions are more uncomfortable.  She is worse with standing, bending, walking, or pursuing housework.  Early in the year we did a CT myelogram which did not reveal high-grade stenosis.  This study was complicated by a seizure.  A recent trial of a spinal cord stimulator provided about 95% relief for her pain syndrome.  She is referred back for consideration of surgical placement of her spinal cord stimulator.     Review of Systems   Constitutional: Positive for fatigue. Negative for activity change, appetite change, chills, diaphoresis, fever and unexpected weight change.   HENT: Negative for congestion, dental problem, drooling, ear discharge, ear pain, facial swelling, hearing loss, mouth sores, nosebleeds, postnasal drip, rhinorrhea, sinus pressure, sneezing, sore throat, tinnitus, trouble swallowing and voice change.    Eyes: Negative for photophobia, pain, discharge, redness, itching and visual disturbance.   Respiratory: Positive for cough. Negative for apnea, choking, chest tightness, shortness of breath, wheezing and stridor.    Cardiovascular: Negative  for chest pain, palpitations and leg swelling.   Gastrointestinal: Positive for abdominal pain. Negative for abdominal distention, anal bleeding, blood in stool, constipation, diarrhea, nausea, rectal pain and vomiting.   Endocrine: Negative for cold intolerance, heat intolerance, polydipsia, polyphagia and polyuria.   Genitourinary: Negative for decreased urine volume, difficulty urinating, dysuria, enuresis, flank pain, frequency, genital sores, hematuria and urgency.   Musculoskeletal: Positive for back pain. Negative for arthralgias, gait problem, joint swelling, myalgias, neck pain and neck stiffness.   Skin: Negative for color change, pallor, rash and wound.   Allergic/Immunologic: Positive for environmental allergies and food allergies. Negative for immunocompromised state.   Neurological: Negative for dizziness, tremors, seizures, syncope, facial asymmetry, speech difficulty, weakness, light-headedness, numbness and headaches.   Hematological: Negative for adenopathy. Does not bruise/bleed easily.   Psychiatric/Behavioral: Negative for agitation, behavioral problems, confusion, decreased concentration, dysphoric mood, hallucinations, self-injury, sleep disturbance and suicidal ideas. The patient is not nervous/anxious and is not hyperactive.          The patient's past medical history, past surgical history, family history, and social history have been reviewed at length in the electronic medical record.     Past Medical History:   Diagnosis Date   • Alcohol dependence     continuous drinking   • Arthritis    • Depression    • Encephalitis    • Facet syndrome, lumbar    • Generalized seizure 2/21/2017   • Headache    • Lumbar canal stenosis    • Meniscus tear     right knee   • RA (rheumatoid arthritis)      Past Surgical History:   Procedure Laterality Date   • APPENDECTOMY     • BRAIN SURGERY  2007    pituitary tumor   • CARPAL TUNNEL RELEASE WITH CUBITAL TUNNEL RELEASE Right    • CHOLECYSTECTOMY  01/2016  "  • CHOLECYSTECTOMY     • ELBOW PROCEDURE  1996    \"fish cutters elbow\" repair   • HYSTERECTOMY     • KNEE CARTILAGE SURGERY  2005   • TONSILLECTOMY     • TRIGGER FINGER RELEASE Left 02/17/2017   • TUBAL ABDOMINAL LIGATION     • TUMOR REMOVAL  1990    fatty tumor on right shoulder     Family History   Problem Relation Age of Onset   • Lung cancer Maternal Grandmother    • Stroke Maternal Grandfather    • Lung cancer Maternal Grandfather    • Stroke Paternal Grandmother    • Brain cancer Other    • COPD Mother    • Heart failure Father      Social History     Social History   • Marital status:      Spouse name: N/A   • Number of children: N/A   • Years of education: N/A     Occupational History   • Not on file.     Social History Main Topics   • Smoking status: Former Smoker     Quit date: 2/15/1987   • Smokeless tobacco: Never Used   • Alcohol use Yes      Comment: socially   • Drug use: No   • Sexual activity: Defer     Other Topics Concern   • Not on file     Social History Narrative     Allergies   Allergen Reactions   • Other Shortness Of Breath     strawberries   • Aspirin Itching   • Codeine Hives   • Trichophyton    • Latex Rash       Physical Exam:   Temp 97.6 °F (36.4 °C)  Ht 66\" (167.6 cm)  Wt 222 lb (101 kg)  BMI 35.83 kg/m2  MUSCULOSKELETAL:  Straight leg raising is negative.  Gary's Sign is negative.  ROM in back normal.  Tenderness in the back to palpation is not observed.  NEUROLOGICAL:  Strength is intact in the lower extremities to direct testing.  Muscle tone is normal throughout.  Station and gait are normal.  Sensation is intact to light touch testing throughout.  Deep tendon reflexes are 1+ and symmetrical.  Coordination is intact.        Medical Decision Making     Data Review:   I reviewed notes from Dr. Alves who recommended St. Trung spinal cord stimulator placement with the Penta lead projecting to the superior endplate of T7.     Diagnosis:   1.  Lumbar degenerative disc " disease.  2.  Lumbar degenerative joint disease.  3.  Lumbar stenosis without neurogenic claudication.     Treatment Options:   I have recommended St. Trung epidural spinal cord stimulator placement.  This is likely to significantly help with her symptoms although it may not eradicate them.  The nature of the procedure as well as the potential risks, complications, limitations, and alternatives to the procedure were discussed at length with the patient and the patient has agreed to proceed with surgery.          Diagnosis Plan   1. Degenerative disc disease, lumbar      2. Facet arthritis of lumbar region      3. Spinal stenosis, lumbar region, without neurogenic claudication

## 2017-10-11 NOTE — PROGRESS NOTES
Patient: Una Villarreal  : 1950    Primary Care Provider: Zachary Reddy DO    Requesting Provider: As above        History    Chief Complaint: Low back and bilateral leg pain.    History of Present Illness: The patient is a 7-year-old retired  who describes a 5-6-year history of low back pain.  She denies any inciting or precipitating events.  If she stands or walks too long she gets a burning in her thighs and her legs will get heavy.   symptoms are somewhat greater on the left and do not extend below the knee.  She reports no bowel or bladder dysfunction.  She's been treated with Neurontin.  She has undergone multiple rhizotomies.  She's had a single epidural injection done.  She has also been treated with physical therapy.  Sometimes sitting is helpful.  She can lie in certain positions and feels better but other positions are more uncomfortable.  She is worse with standing, bending, walking, or pursuing housework.  Early in the year we did a CT myelogram which did not reveal high-grade stenosis.  This study was complicated by a seizure.  A recent trial of a spinal cord stimulator provided about 95% relief for her pain syndrome.  She is referred back for consideration of surgical placement of her spinal cord stimulator.    Review of Systems   Constitutional: Positive for fatigue. Negative for activity change, appetite change, chills, diaphoresis, fever and unexpected weight change.   HENT: Negative for congestion, dental problem, drooling, ear discharge, ear pain, facial swelling, hearing loss, mouth sores, nosebleeds, postnasal drip, rhinorrhea, sinus pressure, sneezing, sore throat, tinnitus, trouble swallowing and voice change.    Eyes: Negative for photophobia, pain, discharge, redness, itching and visual disturbance.   Respiratory: Positive for cough. Negative for apnea, choking, chest tightness, shortness of breath, wheezing and stridor.    Cardiovascular: Negative for chest  "pain, palpitations and leg swelling.   Gastrointestinal: Positive for abdominal pain. Negative for abdominal distention, anal bleeding, blood in stool, constipation, diarrhea, nausea, rectal pain and vomiting.   Endocrine: Negative for cold intolerance, heat intolerance, polydipsia, polyphagia and polyuria.   Genitourinary: Negative for decreased urine volume, difficulty urinating, dysuria, enuresis, flank pain, frequency, genital sores, hematuria and urgency.   Musculoskeletal: Positive for back pain. Negative for arthralgias, gait problem, joint swelling, myalgias, neck pain and neck stiffness.   Skin: Negative for color change, pallor, rash and wound.   Allergic/Immunologic: Positive for environmental allergies and food allergies. Negative for immunocompromised state.   Neurological: Negative for dizziness, tremors, seizures, syncope, facial asymmetry, speech difficulty, weakness, light-headedness, numbness and headaches.   Hematological: Negative for adenopathy. Does not bruise/bleed easily.   Psychiatric/Behavioral: Negative for agitation, behavioral problems, confusion, decreased concentration, dysphoric mood, hallucinations, self-injury, sleep disturbance and suicidal ideas. The patient is not nervous/anxious and is not hyperactive.        The patient's past medical history, past surgical history, family history, and social history have been reviewed at length in the electronic medical record.    Physical Exam:   Temp 97.6 °F (36.4 °C)  Ht 66\" (167.6 cm)  Wt 222 lb (101 kg)  BMI 35.83 kg/m2  MUSCULOSKELETAL:  Straight leg raising is negative.  Gary's Sign is negative.  ROM in back normal.  Tenderness in the back to palpation is not observed.  NEUROLOGICAL:  Strength is intact in the lower extremities to direct testing.  Muscle tone is normal throughout.  Station and gait are normal.  Sensation is intact to light touch testing throughout.  Deep tendon reflexes are 1+ and symmetrical.  Coordination is " intact.      Medical Decision Making    Data Review:   I reviewed notes from Dr. Alves who recommended St. Trung spinal cord stimulator placement with the Penta lead projecting to the superior endplate of T7.    Diagnosis:   1.  Lumbar degenerative disc disease.  2.  Lumbar degenerative joint disease.  3.  Lumbar stenosis without neurogenic claudication.    Treatment Options:   I have recommended St. Trung epidural spinal cord stimulator placement.  This is likely to significantly help with her symptoms although it may not eradicate them.  The nature of the procedure as well as the potential risks, complications, limitations, and alternatives to the procedure were discussed at length with the patient and the patient has agreed to proceed with surgery.       Diagnosis Plan   1. Degenerative disc disease, lumbar     2. Facet arthritis of lumbar region     3. Spinal stenosis, lumbar region, without neurogenic claudication       Scribed for Ever Samaniego MD by Aneta Duarte CMA on 10/11/2017 at 8:42 AM    I, Dr. Samaniego, personally performed the services described in the documentation, as scribed in my presence, and it is both accurate and complete.

## 2017-10-19 ENCOUNTER — APPOINTMENT (OUTPATIENT)
Dept: PREADMISSION TESTING | Facility: HOSPITAL | Age: 67
End: 2017-10-19

## 2017-10-19 VITALS — HEIGHT: 67 IN | BODY MASS INDEX: 34.39 KG/M2 | WEIGHT: 219.14 LBS

## 2017-10-19 DIAGNOSIS — M47.816 FACET ARTHRITIS OF LUMBAR REGION: ICD-10-CM

## 2017-10-19 LAB
DEPRECATED RDW RBC AUTO: 46.2 FL (ref 37–54)
ERYTHROCYTE [DISTWIDTH] IN BLOOD BY AUTOMATED COUNT: 13.2 % (ref 11.3–14.5)
HCT VFR BLD AUTO: 37.1 % (ref 34.5–44)
HGB BLD-MCNC: 12.2 G/DL (ref 11.5–15.5)
MCH RBC QN AUTO: 31.7 PG (ref 27–31)
MCHC RBC AUTO-ENTMCNC: 32.9 G/DL (ref 32–36)
MCV RBC AUTO: 96.4 FL (ref 80–99)
PLATELET # BLD AUTO: 214 10*3/MM3 (ref 150–450)
PMV BLD AUTO: 9.1 FL (ref 6–12)
RBC # BLD AUTO: 3.85 10*6/MM3 (ref 3.89–5.14)
WBC NRBC COR # BLD: 4.12 10*3/MM3 (ref 3.5–10.8)

## 2017-10-19 PROCEDURE — 93005 ELECTROCARDIOGRAM TRACING: CPT

## 2017-10-19 PROCEDURE — 36415 COLL VENOUS BLD VENIPUNCTURE: CPT

## 2017-10-19 PROCEDURE — 85027 COMPLETE CBC AUTOMATED: CPT | Performed by: ANESTHESIOLOGY

## 2017-10-19 PROCEDURE — 87081 CULTURE SCREEN ONLY: CPT | Performed by: NEUROLOGICAL SURGERY

## 2017-10-19 RX ORDER — BUDESONIDE AND FORMOTEROL FUMARATE DIHYDRATE 160; 4.5 UG/1; UG/1
2 AEROSOL RESPIRATORY (INHALATION) AS NEEDED
COMMUNITY

## 2017-10-19 RX ORDER — GABAPENTIN 100 MG/1
100 CAPSULE ORAL 4 TIMES DAILY
COMMUNITY
End: 2021-02-23 | Stop reason: ALTCHOICE

## 2017-10-21 LAB — MRSA SPEC QL CULT: NORMAL

## 2017-10-23 ENCOUNTER — APPOINTMENT (OUTPATIENT)
Dept: GENERAL RADIOLOGY | Facility: HOSPITAL | Age: 67
End: 2017-10-23

## 2017-10-23 ENCOUNTER — ANESTHESIA EVENT (OUTPATIENT)
Dept: PERIOP | Facility: HOSPITAL | Age: 67
End: 2017-10-23

## 2017-10-23 ENCOUNTER — HOSPITAL ENCOUNTER (OUTPATIENT)
Facility: HOSPITAL | Age: 67
Setting detail: OBSERVATION
Discharge: HOME | End: 2017-10-27
Attending: NEUROLOGICAL SURGERY | Admitting: NEUROLOGICAL SURGERY

## 2017-10-23 ENCOUNTER — ANESTHESIA (OUTPATIENT)
Dept: PERIOP | Facility: HOSPITAL | Age: 67
End: 2017-10-23

## 2017-10-23 DIAGNOSIS — M47.816 LUMBAR FACET ARTHROPATHY: Primary | ICD-10-CM

## 2017-10-23 DIAGNOSIS — M47.816 FACET ARTHRITIS OF LUMBAR REGION: ICD-10-CM

## 2017-10-23 LAB
GLUCOSE BLDC GLUCOMTR-MCNC: 135 MG/DL (ref 70–130)
POTASSIUM BLDA-SCNC: 3.87 MMOL/L (ref 3.5–5.3)

## 2017-10-23 PROCEDURE — 25010000002 HYDROMORPHONE PER 4 MG: Performed by: PHYSICIAN ASSISTANT

## 2017-10-23 PROCEDURE — G0378 HOSPITAL OBSERVATION PER HR: HCPCS

## 2017-10-23 PROCEDURE — 82962 GLUCOSE BLOOD TEST: CPT

## 2017-10-23 PROCEDURE — A9270 NON-COVERED ITEM OR SERVICE: HCPCS | Performed by: ANESTHESIOLOGY

## 2017-10-23 PROCEDURE — 25010000002 HYDROMORPHONE PER 4 MG: Performed by: NURSE ANESTHETIST, CERTIFIED REGISTERED

## 2017-10-23 PROCEDURE — 25010000002 MIDAZOLAM PER 1 MG: Performed by: ANESTHESIOLOGY

## 2017-10-23 PROCEDURE — 63655 IMPLANT NEUROELECTRODES: CPT | Performed by: NEUROLOGICAL SURGERY

## 2017-10-23 PROCEDURE — A9270 NON-COVERED ITEM OR SERVICE: HCPCS | Performed by: NEUROLOGICAL SURGERY

## 2017-10-23 PROCEDURE — 25010000003 CEFAZOLIN IN DEXTROSE 2-4 GM/100ML-% SOLUTION: Performed by: NEUROLOGICAL SURGERY

## 2017-10-23 PROCEDURE — 63710000001 ALBUTEROL PER 1 MG: Performed by: NEUROLOGICAL SURGERY

## 2017-10-23 PROCEDURE — 84132 ASSAY OF SERUM POTASSIUM: CPT | Performed by: NEUROLOGICAL SURGERY

## 2017-10-23 PROCEDURE — 25010000002 NEOSTIGMINE 10 MG/10ML SOLUTION: Performed by: NURSE ANESTHETIST, CERTIFIED REGISTERED

## 2017-10-23 PROCEDURE — 94640 AIRWAY INHALATION TREATMENT: CPT

## 2017-10-23 PROCEDURE — C1778 LEAD, NEUROSTIMULATOR: HCPCS | Performed by: NEUROLOGICAL SURGERY

## 2017-10-23 PROCEDURE — 63710000001 HYDROCODONE-ACETAMINOPHEN 10-325 MG TABLET: Performed by: NEUROLOGICAL SURGERY

## 2017-10-23 PROCEDURE — P9612 CATHETERIZE FOR URINE SPEC: HCPCS

## 2017-10-23 PROCEDURE — 76000 FLUOROSCOPY <1 HR PHYS/QHP: CPT

## 2017-10-23 PROCEDURE — 63710000001 OXYCODONE-ACETAMINOPHEN 7.5-325 MG TABLET: Performed by: NURSE ANESTHETIST, CERTIFIED REGISTERED

## 2017-10-23 PROCEDURE — A9270 NON-COVERED ITEM OR SERVICE: HCPCS | Performed by: NURSE ANESTHETIST, CERTIFIED REGISTERED

## 2017-10-23 PROCEDURE — 25010000002 VANCOMYCIN PER 500 MG: Performed by: NEUROLOGICAL SURGERY

## 2017-10-23 PROCEDURE — 63710000001 FAMOTIDINE 20 MG TABLET: Performed by: ANESTHESIOLOGY

## 2017-10-23 PROCEDURE — 25010000002 ONDANSETRON PER 1 MG: Performed by: NURSE ANESTHETIST, CERTIFIED REGISTERED

## 2017-10-23 PROCEDURE — 25010000002 DEXAMETHASONE PER 1 MG: Performed by: NURSE ANESTHETIST, CERTIFIED REGISTERED

## 2017-10-23 PROCEDURE — 25010000002 PROPOFOL 10 MG/ML EMULSION: Performed by: NURSE ANESTHETIST, CERTIFIED REGISTERED

## 2017-10-23 PROCEDURE — 94799 UNLISTED PULMONARY SVC/PX: CPT

## 2017-10-23 PROCEDURE — 25010000002 PROMETHAZINE PER 50 MG: Performed by: NURSE ANESTHETIST, CERTIFIED REGISTERED

## 2017-10-23 PROCEDURE — 63710000001 TIZANIDINE 4 MG TABLET: Performed by: ANESTHESIOLOGY

## 2017-10-23 PROCEDURE — 25010000002 PHENYLEPHRINE PER 1 ML: Performed by: NURSE ANESTHETIST, CERTIFIED REGISTERED

## 2017-10-23 PROCEDURE — 25010000002 FENTANYL CITRATE (PF) 100 MCG/2ML SOLUTION: Performed by: NURSE ANESTHETIST, CERTIFIED REGISTERED

## 2017-10-23 PROCEDURE — 76001 HC FLUORO GREATER THAN 1 HOUR: CPT

## 2017-10-23 PROCEDURE — 63685 INS/RPLC SPI NPG/RCVR POCKET: CPT | Performed by: NEUROLOGICAL SURGERY

## 2017-10-23 PROCEDURE — C1767 GENERATOR, NEURO NON-RECHARG: HCPCS | Performed by: NEUROLOGICAL SURGERY

## 2017-10-23 DEVICE — LD STIM PENTA PADL KT 16CH 3MM 60CM: Type: IMPLANTABLE DEVICE | Site: SPINE THORACIC | Status: FUNCTIONAL

## 2017-10-23 DEVICE — GEN IPG SCS PROCLAIM 7 ELITE NONRECHG: Type: IMPLANTABLE DEVICE | Site: SPINE THORACIC | Status: FUNCTIONAL

## 2017-10-23 RX ORDER — DIAZEPAM 5 MG/1
5 TABLET ORAL EVERY 6 HOURS PRN
Status: DISCONTINUED | OUTPATIENT
Start: 2017-10-23 | End: 2017-10-25

## 2017-10-23 RX ORDER — HYDROCODONE BITARTRATE AND ACETAMINOPHEN 10; 325 MG/1; MG/1
1 TABLET ORAL EVERY 8 HOURS PRN
Status: DISCONTINUED | OUTPATIENT
Start: 2017-10-23 | End: 2017-10-27 | Stop reason: HOSPADM

## 2017-10-23 RX ORDER — PROMETHAZINE HYDROCHLORIDE 25 MG/1
25 TABLET ORAL ONCE AS NEEDED
Status: COMPLETED | OUTPATIENT
Start: 2017-10-23 | End: 2017-10-23

## 2017-10-23 RX ORDER — HYDROCODONE BITARTRATE AND ACETAMINOPHEN 10; 325 MG/1; MG/1
1 TABLET ORAL 3 TIMES DAILY PRN
Qty: 30 TABLET | Refills: 0 | Status: SHIPPED | OUTPATIENT
Start: 2017-10-23 | End: 2017-11-05

## 2017-10-23 RX ORDER — IPRATROPIUM BROMIDE AND ALBUTEROL SULFATE 2.5; .5 MG/3ML; MG/3ML
3 SOLUTION RESPIRATORY (INHALATION) ONCE AS NEEDED
Status: COMPLETED | OUTPATIENT
Start: 2017-10-23 | End: 2017-10-23

## 2017-10-23 RX ORDER — IPRATROPIUM BROMIDE AND ALBUTEROL SULFATE 2.5; .5 MG/3ML; MG/3ML
3 SOLUTION RESPIRATORY (INHALATION) EVERY 4 HOURS PRN
Status: DISCONTINUED | OUTPATIENT
Start: 2017-10-23 | End: 2017-10-23 | Stop reason: HOSPADM

## 2017-10-23 RX ORDER — LIDOCAINE HYDROCHLORIDE 10 MG/ML
0.5 INJECTION, SOLUTION EPIDURAL; INFILTRATION; INTRACAUDAL; PERINEURAL ONCE AS NEEDED
Status: COMPLETED | OUTPATIENT
Start: 2017-10-23 | End: 2017-10-23

## 2017-10-23 RX ORDER — MAGNESIUM HYDROXIDE 1200 MG/15ML
LIQUID ORAL AS NEEDED
Status: DISCONTINUED | OUTPATIENT
Start: 2017-10-23 | End: 2017-10-23 | Stop reason: HOSPADM

## 2017-10-23 RX ORDER — PROMETHAZINE HYDROCHLORIDE 25 MG/1
25 SUPPOSITORY RECTAL ONCE AS NEEDED
Status: COMPLETED | OUTPATIENT
Start: 2017-10-23 | End: 2017-10-23

## 2017-10-23 RX ORDER — SODIUM CHLORIDE 0.9 % (FLUSH) 0.9 %
1-10 SYRINGE (ML) INJECTION AS NEEDED
Status: DISCONTINUED | OUTPATIENT
Start: 2017-10-23 | End: 2017-10-23 | Stop reason: HOSPADM

## 2017-10-23 RX ORDER — FENTANYL CITRATE 50 UG/ML
50 INJECTION, SOLUTION INTRAMUSCULAR; INTRAVENOUS
Status: DISCONTINUED | OUTPATIENT
Start: 2017-10-23 | End: 2017-10-23 | Stop reason: HOSPADM

## 2017-10-23 RX ORDER — POLYETHYLENE GLYCOL 3350 17 G/17G
17 POWDER, FOR SOLUTION ORAL DAILY
Status: DISCONTINUED | OUTPATIENT
Start: 2017-10-24 | End: 2017-10-27 | Stop reason: HOSPADM

## 2017-10-23 RX ORDER — PROPOFOL 10 MG/ML
VIAL (ML) INTRAVENOUS AS NEEDED
Status: DISCONTINUED | OUTPATIENT
Start: 2017-10-23 | End: 2017-10-23 | Stop reason: SURG

## 2017-10-23 RX ORDER — METHADONE HYDROCHLORIDE 10 MG/1
10 TABLET ORAL ONCE
Status: COMPLETED | OUTPATIENT
Start: 2017-10-23 | End: 2017-10-23

## 2017-10-23 RX ORDER — ATRACURIUM BESYLATE 10 MG/ML
INJECTION, SOLUTION INTRAVENOUS AS NEEDED
Status: DISCONTINUED | OUTPATIENT
Start: 2017-10-23 | End: 2017-10-23 | Stop reason: SURG

## 2017-10-23 RX ORDER — ONDANSETRON 2 MG/ML
INJECTION INTRAMUSCULAR; INTRAVENOUS AS NEEDED
Status: DISCONTINUED | OUTPATIENT
Start: 2017-10-23 | End: 2017-10-23 | Stop reason: SURG

## 2017-10-23 RX ORDER — NEOSTIGMINE METHYLSULFATE 1 MG/ML
INJECTION, SOLUTION INTRAVENOUS AS NEEDED
Status: DISCONTINUED | OUTPATIENT
Start: 2017-10-23 | End: 2017-10-23 | Stop reason: SURG

## 2017-10-23 RX ORDER — CEFAZOLIN SODIUM 2 G/100ML
2 INJECTION, SOLUTION INTRAVENOUS EVERY 8 HOURS
Status: COMPLETED | OUTPATIENT
Start: 2017-10-23 | End: 2017-10-24

## 2017-10-23 RX ORDER — GLYCOPYRROLATE 0.2 MG/ML
INJECTION INTRAMUSCULAR; INTRAVENOUS AS NEEDED
Status: DISCONTINUED | OUTPATIENT
Start: 2017-10-23 | End: 2017-10-23 | Stop reason: SURG

## 2017-10-23 RX ORDER — CEFAZOLIN SODIUM 2 G/100ML
2 INJECTION, SOLUTION INTRAVENOUS ONCE
Status: COMPLETED | OUTPATIENT
Start: 2017-10-23 | End: 2017-10-23

## 2017-10-23 RX ORDER — HYDROMORPHONE HYDROCHLORIDE 1 MG/ML
0.5 INJECTION, SOLUTION INTRAMUSCULAR; INTRAVENOUS; SUBCUTANEOUS ONCE
Status: COMPLETED | OUTPATIENT
Start: 2017-10-23 | End: 2017-10-23

## 2017-10-23 RX ORDER — LIDOCAINE HYDROCHLORIDE 10 MG/ML
INJECTION, SOLUTION EPIDURAL; INFILTRATION; INTRACAUDAL; PERINEURAL AS NEEDED
Status: DISCONTINUED | OUTPATIENT
Start: 2017-10-23 | End: 2017-10-23 | Stop reason: SURG

## 2017-10-23 RX ORDER — PROMETHAZINE HYDROCHLORIDE 25 MG/ML
6.25 INJECTION, SOLUTION INTRAMUSCULAR; INTRAVENOUS ONCE AS NEEDED
Status: COMPLETED | OUTPATIENT
Start: 2017-10-23 | End: 2017-10-23

## 2017-10-23 RX ORDER — METHADONE HYDROCHLORIDE 10 MG/1
10 TABLET ORAL EVERY 8 HOURS SCHEDULED
Status: DISCONTINUED | OUTPATIENT
Start: 2017-10-23 | End: 2017-10-27 | Stop reason: HOSPADM

## 2017-10-23 RX ORDER — LABETALOL HYDROCHLORIDE 5 MG/ML
5 INJECTION, SOLUTION INTRAVENOUS
Status: DISCONTINUED | OUTPATIENT
Start: 2017-10-23 | End: 2017-10-23 | Stop reason: HOSPADM

## 2017-10-23 RX ORDER — ONDANSETRON 2 MG/ML
4 INJECTION INTRAMUSCULAR; INTRAVENOUS ONCE AS NEEDED
Status: DISCONTINUED | OUTPATIENT
Start: 2017-10-23 | End: 2017-10-23 | Stop reason: HOSPADM

## 2017-10-23 RX ORDER — HYDROMORPHONE HYDROCHLORIDE 1 MG/ML
0.5 INJECTION, SOLUTION INTRAMUSCULAR; INTRAVENOUS; SUBCUTANEOUS
Status: DISCONTINUED | OUTPATIENT
Start: 2017-10-23 | End: 2017-10-23 | Stop reason: HOSPADM

## 2017-10-23 RX ORDER — SODIUM CHLORIDE, SODIUM LACTATE, POTASSIUM CHLORIDE, CALCIUM CHLORIDE 600; 310; 30; 20 MG/100ML; MG/100ML; MG/100ML; MG/100ML
75 INJECTION, SOLUTION INTRAVENOUS CONTINUOUS
Status: DISCONTINUED | OUTPATIENT
Start: 2017-10-23 | End: 2017-10-25

## 2017-10-23 RX ORDER — MIDAZOLAM HYDROCHLORIDE 1 MG/ML
1 INJECTION INTRAMUSCULAR; INTRAVENOUS ONCE
Status: DISCONTINUED | OUTPATIENT
Start: 2017-10-23 | End: 2017-10-23

## 2017-10-23 RX ORDER — MEPERIDINE HYDROCHLORIDE 25 MG/ML
12.5 INJECTION INTRAMUSCULAR; INTRAVENOUS; SUBCUTANEOUS
Status: COMPLETED | OUTPATIENT
Start: 2017-10-23 | End: 2017-10-23

## 2017-10-23 RX ORDER — HYDRALAZINE HYDROCHLORIDE 20 MG/ML
5 INJECTION INTRAMUSCULAR; INTRAVENOUS
Status: DISCONTINUED | OUTPATIENT
Start: 2017-10-23 | End: 2017-10-23 | Stop reason: HOSPADM

## 2017-10-23 RX ORDER — TIZANIDINE 4 MG/1
2 TABLET ORAL ONCE
Status: COMPLETED | OUTPATIENT
Start: 2017-10-23 | End: 2017-10-23

## 2017-10-23 RX ORDER — FAMOTIDINE 20 MG/1
20 TABLET, FILM COATED ORAL ONCE
Status: COMPLETED | OUTPATIENT
Start: 2017-10-23 | End: 2017-10-23

## 2017-10-23 RX ORDER — FAMOTIDINE 10 MG/ML
20 INJECTION, SOLUTION INTRAVENOUS ONCE
Status: DISCONTINUED | OUTPATIENT
Start: 2017-10-23 | End: 2017-10-23 | Stop reason: HOSPADM

## 2017-10-23 RX ORDER — DESMOPRESSIN ACETATE 0.2 MG/1
100 TABLET ORAL 2 TIMES DAILY
Status: DISCONTINUED | OUTPATIENT
Start: 2017-10-23 | End: 2017-10-27 | Stop reason: HOSPADM

## 2017-10-23 RX ORDER — OXYCODONE AND ACETAMINOPHEN 7.5; 325 MG/1; MG/1
1 TABLET ORAL ONCE AS NEEDED
Status: DISCONTINUED | OUTPATIENT
Start: 2017-10-23 | End: 2017-10-23 | Stop reason: HOSPADM

## 2017-10-23 RX ORDER — ALBUTEROL SULFATE 1.25 MG/3ML
1.25 SOLUTION RESPIRATORY (INHALATION) ONCE
Status: DISCONTINUED | OUTPATIENT
Start: 2017-10-23 | End: 2017-10-23

## 2017-10-23 RX ORDER — FENTANYL CITRATE 50 UG/ML
INJECTION, SOLUTION INTRAMUSCULAR; INTRAVENOUS AS NEEDED
Status: DISCONTINUED | OUTPATIENT
Start: 2017-10-23 | End: 2017-10-23 | Stop reason: SURG

## 2017-10-23 RX ORDER — NALOXONE HCL 0.4 MG/ML
0.4 VIAL (ML) INJECTION AS NEEDED
Status: DISCONTINUED | OUTPATIENT
Start: 2017-10-23 | End: 2017-10-23 | Stop reason: HOSPADM

## 2017-10-23 RX ORDER — DEXAMETHASONE SODIUM PHOSPHATE 4 MG/ML
INJECTION, SOLUTION INTRA-ARTICULAR; INTRALESIONAL; INTRAMUSCULAR; INTRAVENOUS; SOFT TISSUE AS NEEDED
Status: DISCONTINUED | OUTPATIENT
Start: 2017-10-23 | End: 2017-10-23 | Stop reason: SURG

## 2017-10-23 RX ORDER — OXYCODONE HYDROCHLORIDE AND ACETAMINOPHEN 5; 325 MG/1; MG/1
1 TABLET ORAL ONCE AS NEEDED
Status: DISCONTINUED | OUTPATIENT
Start: 2017-10-23 | End: 2017-10-23 | Stop reason: HOSPADM

## 2017-10-23 RX ADMIN — ONDANSETRON 4 MG: 2 INJECTION INTRAMUSCULAR; INTRAVENOUS at 10:17

## 2017-10-23 RX ADMIN — MEPERIDINE HYDROCHLORIDE 12.5 MG: 25 INJECTION, SOLUTION INTRAMUSCULAR; INTRAVENOUS; SUBCUTANEOUS at 10:55

## 2017-10-23 RX ADMIN — HYDROCODONE BITARTRATE AND ACETAMINOPHEN 1 TABLET: 10; 325 TABLET ORAL at 23:48

## 2017-10-23 RX ADMIN — FENTANYL CITRATE 50 MCG: 50 INJECTION, SOLUTION INTRAMUSCULAR; INTRAVENOUS at 09:27

## 2017-10-23 RX ADMIN — OXYCODONE HYDROCHLORIDE AND ACETAMINOPHEN 1 TABLET: 7.5; 325 TABLET ORAL at 11:17

## 2017-10-23 RX ADMIN — NEOSTIGMINE METHYLSULFATE 1.5 MG: 1 INJECTION, SOLUTION INTRAVENOUS at 10:27

## 2017-10-23 RX ADMIN — CEFAZOLIN SODIUM 2 G: 2 INJECTION, SOLUTION INTRAVENOUS at 09:26

## 2017-10-23 RX ADMIN — FENTANYL CITRATE 50 MCG: 50 INJECTION INTRAMUSCULAR; INTRAVENOUS at 10:56

## 2017-10-23 RX ADMIN — HYDROMORPHONE HYDROCHLORIDE 0.5 MG: 1 INJECTION, SOLUTION INTRAMUSCULAR; INTRAVENOUS; SUBCUTANEOUS at 17:07

## 2017-10-23 RX ADMIN — PROPOFOL 200 MG: 10 INJECTION, EMULSION INTRAVENOUS at 09:27

## 2017-10-23 RX ADMIN — MIDAZOLAM HYDROCHLORIDE 1 MG: 1 INJECTION, SOLUTION INTRAMUSCULAR; INTRAVENOUS at 13:23

## 2017-10-23 RX ADMIN — GLYCOPYRROLATE 0.2 MG: 0.2 INJECTION, SOLUTION INTRAMUSCULAR; INTRAVENOUS at 10:27

## 2017-10-23 RX ADMIN — DIAZEPAM 5 MG: 5 TABLET ORAL at 19:41

## 2017-10-23 RX ADMIN — MEPERIDINE HYDROCHLORIDE 12.5 MG: 25 INJECTION, SOLUTION INTRAMUSCULAR; INTRAVENOUS; SUBCUTANEOUS at 10:50

## 2017-10-23 RX ADMIN — DEXAMETHASONE SODIUM PHOSPHATE 8 MG: 4 INJECTION, SOLUTION INTRAMUSCULAR; INTRAVENOUS at 09:41

## 2017-10-23 RX ADMIN — EPHEDRINE SULFATE 5 MG: 50 INJECTION INTRAMUSCULAR; INTRAVENOUS; SUBCUTANEOUS at 09:49

## 2017-10-23 RX ADMIN — TIZANIDINE 2 MG: 4 TABLET ORAL at 14:03

## 2017-10-23 RX ADMIN — FAMOTIDINE 20 MG: 20 TABLET, FILM COATED ORAL at 07:28

## 2017-10-23 RX ADMIN — ATRACURIUM BESYLATE 10 MG: 10 INJECTION, SOLUTION INTRAVENOUS at 10:05

## 2017-10-23 RX ADMIN — PROMETHAZINE HYDROCHLORIDE 6.25 MG: 25 INJECTION INTRAMUSCULAR; INTRAVENOUS at 11:50

## 2017-10-23 RX ADMIN — METHADONE HYDROCHLORIDE 10 MG: 10 TABLET ORAL at 20:31

## 2017-10-23 RX ADMIN — IPRATROPIUM BROMIDE AND ALBUTEROL SULFATE 3 ML: .5; 3 SOLUTION RESPIRATORY (INHALATION) at 13:08

## 2017-10-23 RX ADMIN — PHENYLEPHRINE HYDROCHLORIDE 100 MCG: 10 INJECTION INTRAVENOUS at 10:20

## 2017-10-23 RX ADMIN — IPRATROPIUM BROMIDE AND ALBUTEROL SULFATE 3 ML: .5; 3 SOLUTION RESPIRATORY (INHALATION) at 14:20

## 2017-10-23 RX ADMIN — ALBUTEROL SULFATE 2.5 MG: 2.5 SOLUTION RESPIRATORY (INHALATION) at 08:12

## 2017-10-23 RX ADMIN — HYDROMORPHONE HYDROCHLORIDE 0.5 MG: 1 INJECTION, SOLUTION INTRAMUSCULAR; INTRAVENOUS; SUBCUTANEOUS at 10:47

## 2017-10-23 RX ADMIN — SODIUM CHLORIDE, POTASSIUM CHLORIDE, SODIUM LACTATE AND CALCIUM CHLORIDE 9 ML/HR: 600; 310; 30; 20 INJECTION, SOLUTION INTRAVENOUS at 07:28

## 2017-10-23 RX ADMIN — ATRACURIUM BESYLATE 30 MG: 10 INJECTION, SOLUTION INTRAVENOUS at 09:27

## 2017-10-23 RX ADMIN — PHENYLEPHRINE HYDROCHLORIDE 100 MCG: 10 INJECTION INTRAVENOUS at 09:52

## 2017-10-23 RX ADMIN — FENTANYL CITRATE 50 MCG: 50 INJECTION INTRAMUSCULAR; INTRAVENOUS at 12:24

## 2017-10-23 RX ADMIN — LIDOCAINE HYDROCHLORIDE 50 MG: 10 INJECTION, SOLUTION EPIDURAL; INFILTRATION; INTRACAUDAL; PERINEURAL at 09:27

## 2017-10-23 RX ADMIN — GLYCOPYRROLATE 0.2 MG: 0.2 INJECTION, SOLUTION INTRAMUSCULAR; INTRAVENOUS at 09:44

## 2017-10-23 RX ADMIN — LIDOCAINE HYDROCHLORIDE 0.5 ML: 10 INJECTION, SOLUTION EPIDURAL; INFILTRATION; INTRACAUDAL; PERINEURAL at 07:28

## 2017-10-23 RX ADMIN — EPHEDRINE SULFATE 5 MG: 50 INJECTION INTRAMUSCULAR; INTRAVENOUS; SUBCUTANEOUS at 09:47

## 2017-10-23 RX ADMIN — FENTANYL CITRATE 50 MCG: 50 INJECTION, SOLUTION INTRAMUSCULAR; INTRAVENOUS at 10:05

## 2017-10-23 RX ADMIN — DESMOPRESSIN ACETATE 100 MCG: 0.2 TABLET ORAL at 23:49

## 2017-10-23 RX ADMIN — HYDROCODONE BITARTRATE AND ACETAMINOPHEN 1 TABLET: 10; 325 TABLET ORAL at 15:46

## 2017-10-23 RX ADMIN — SODIUM CHLORIDE, POTASSIUM CHLORIDE, SODIUM LACTATE AND CALCIUM CHLORIDE 75 ML/HR: 600; 310; 30; 20 INJECTION, SOLUTION INTRAVENOUS at 19:42

## 2017-10-23 NOTE — ANESTHESIA POSTPROCEDURE EVALUATION
Patient: Una Villarreal    Procedure Summary     Date Anesthesia Start Anesthesia Stop Room / Location    10/23/17 0923 1043 [facility] / [facility]       Procedure Diagnosis Surgeon Provider    SPINAL CORD STIMULATOR INSERTION PHASE 1 St. Trung (N/A Back) Facet arthritis of lumbar region  (Facet arthritis of lumbar region [M46.96]) MD Andrew Parker MD          Anesthesia Type: general  Last vitals  BP   156/89 (10/23/17 0714)   Temp   97.8 °F (36.6 °C) (10/23/17 1041)   Pulse   83 (10/23/17 1041)   Resp   16 (10/23/17 1041)     SpO2   98 % (10/23/17 1041)     Post Anesthesia Care and Evaluation    Patient location during evaluation: PACU  Patient participation: complete - patient participated  Level of consciousness: sleepy but conscious  Pain score: 0  Pain management: adequate  Airway patency: patent  Anesthetic complications: No anesthetic complications  PONV Status: none  Cardiovascular status: hemodynamically stable and acceptable  Respiratory status: nonlabored ventilation, acceptable and nasal cannula  Hydration status: acceptable

## 2017-10-23 NOTE — OP NOTE
NEUROSURGICAL OPERATIVE NOTE        PREOPERATIVE DIAGNOSIS:    Chronic back and leg pain.      POSTOPERATIVE DIAGNOSIS:  Same      PROCEDURE:  T9 laminotomy for placement of St. Trung epidural spinal cord stimulator      SURGEON:  Ever Samaniego M.D.      ASSISTANT:  Charlee Gray PA-C      ANESTHESIA:  General      ESTIMATED BLOOD LOSS:  Minimal      SPECIMEN:  None      DRAINS:  None      COMPLICATIONS:  None      CLINICAL NOTE:  The patient is a 67-year-old woman with chronic back and bilateral lower extremity pain.  A trial of an epidural spinal cord stimulator provided substantial relief for her back and leg symptoms.  As such, she presents at this time for spinal cord stimulator placement.  The nature of the procedure as well as the potential risks, complications, limitations, and alternatives to the procedure were discussed at length with the patient and the patient has agreed to proceed with surgery.      TECHNICAL NOTE:  The patient was brought to the operating room and while on her cart general endotracheal anesthesia was achieved. She was then rolled prone onto blanket rolls, maintained longitudinally under her chest and abdomen. Special care was ensured to protect pressure points. Her mid back and flanks were prepared and draped in the usual fashion. The C-arm was brought into use and using AP fluoroscopy the T9-T10 level was localized. Midline incision was fashioned measuring approximately 3-4 cm. Underlying tissues were divided with electrocautery to provide exposure to the T9-T10 level. Another radiograph confirmed the operative level. A central laminotomy was fashioned utilizing Leksell rongeur, Midas Benji drill, and Kerrison punches. The dorsal epidural space was defined in a cephalad fashion with a Penfield 3. Spinal cord stimulator was then advanced in a single pass into the midline to the superior endplate of T7. This was maintained in a midline position. The lead was secured in place with lead  tethers. A transverse incision was made on the left flank and a subcutaneous pocket was fashioned using cautery and finger dissection. Lead passer was utilized to pass the Penta lead wires from the midline incision to the flank incision. The leads were affixed to IPG and impedances were appropriate. Some vancomycin powder was placed in the subcutaneous pocket and the IPG was placed in the pocket. The subcutaneous tissues were closed in a single layer with 2-0 Vicryl suture in interrupted fashion after securing the IPG with 3-0 silk sutures. The thoracic midline incision was washed out. Some vancomycin powder was sprinkled in the depths and then more superficially as the wound was closed. The paraspinous muscle fascia was reapproximated in interrupted fashion with 0 Vicryl suture. Subcutaneous tissues were closed in layers with 3-0 Vicryl suture. The skin at each site was closed in a running subcuticular fashion with 3-0 Vicryl suture. Steri-Strips and sterile dressings were applied. Completion fluoroscopy demonstrated good position of the lead, nearly to the top of the T7 endplate in the midline. The patient did receive preoperative antibiotics. She was subsequently rolled onto her cart, extubated, and taken to recovery room in satisfactory condition.            Ever Samaniego M.D.

## 2017-10-23 NOTE — INTERVAL H&P NOTE
Pre-Op H&P (See Recent Office Note Attached)    Chief complaint: Low back into BLE    Review of Systems:  General ROS:  no fever, chills, rashes, No change since last office visit  Cardiovascular ROS: no chest pain or dyspnea on exertion  Respiratory ROS: no cough, shortness of breath, or wheezing    Immunization History:   Influenza:  no  Pneumococcal:  no  Tetanus:  unknown    Vital Signs:  /89 (BP Location: Right arm, Patient Position: Lying)  Pulse 67  Temp 96.4 °F (35.8 °C) (Temporal Artery )   SpO2 96%    Physical Exam:    CV:  S1S2 regular rate and rhythm, no murmur               Resp:  Clear to auscultation; respirations regular, even and unlabored    Results Review:    I reviewed the patient's new clinical results.    Cancer Staging (if applicable)  Cancer Patient: __ yes _x_no __unknown; If yes, clinical stage T:__ N:__M:__, stage group or __N/A    Reva Lehman, APRN  10/23/2017   7:48 AM

## 2017-10-23 NOTE — ANESTHESIA PROCEDURE NOTES
Airway  Urgency: elective    Airway not difficult    General Information and Staff    Patient location during procedure: OR  CRNA: JONO SMILEY    Indications and Patient Condition  Indications for airway management: airway protection    Preoxygenated: yes  MILS not maintained throughout  Mask difficulty assessment: 1 - vent by mask    Final Airway Details  Final airway type: endotracheal airway      Successful airway: ETT  Cuffed: yes   Successful intubation technique: direct laryngoscopy  Endotracheal tube insertion site: oral  Blade: Adan  Blade size: #3  ETT size: 7.0 mm  Cormack-Lehane Classification: grade IIa - partial view of glottis  Placement verified by: chest auscultation and capnometry   Cuff volume (mL): 7  Measured from: lips  ETT to lips (cm): 20  Number of attempts at approach: 1    Additional Comments  Negative epigastric sounds, Breath sound equal bilaterally with symmetric chest rise and fall. Teeth and lips as preop.

## 2017-10-23 NOTE — PLAN OF CARE
Problem: Perioperative Period (Adult)  Goal: Signs and Symptoms of Listed Potential Problems Will be Absent or Manageable (Perioperative Period)  Outcome: Ongoing (interventions implemented as appropriate)    10/23/17 0709   Perioperative Period   Problems Assessed (Perioperative Period) pain   Problems Present (Perioperative Period) none

## 2017-10-23 NOTE — NURSING NOTE
Patient has tolerated PO fluids and ambulated to the bathroom.  Patient has not yet voided.      Dr Fragoso assessed patient prior to patient leaving the PACU.  No new orders received.

## 2017-10-23 NOTE — ANESTHESIA PREPROCEDURE EVALUATION
Anesthesia Evaluation     Patient summary reviewed and Nursing notes reviewed          Airway   Mallampati: II  TM distance: >3 FB  Neck ROM: full  no difficulty expected  Dental - normal exam     Pulmonary - normal exam   (+) asthma,   Cardiovascular - normal exam    (+) hypertension,       Neuro/Psych  (+) seizures,    GI/Hepatic/Renal/Endo    (+) morbid obesity, hiatal hernia, GERD, diabetes mellitus (diabetes insipidus --> desmopressin tx), hypothyroidism,     Musculoskeletal     (+) back pain,   Abdominal  - normal exam    Bowel sounds: normal.   Substance History   (+) alcohol use,      OB/GYN negative ob/gyn ROS         Other   (+) arthritis                                   Anesthesia Plan    ASA 3     general     intravenous induction   Anesthetic plan and risks discussed with patient.    Plan discussed with CRNA.

## 2017-10-24 PROCEDURE — 94799 UNLISTED PULMONARY SVC/PX: CPT

## 2017-10-24 PROCEDURE — 94640 AIRWAY INHALATION TREATMENT: CPT

## 2017-10-24 PROCEDURE — 25010000002 PROMETHAZINE PER 50 MG: Performed by: PHYSICIAN ASSISTANT

## 2017-10-24 PROCEDURE — 25010000002 HYDROMORPHONE PER 4 MG: Performed by: PHYSICIAN ASSISTANT

## 2017-10-24 PROCEDURE — 25010000003 CEFAZOLIN IN DEXTROSE 2-4 GM/100ML-% SOLUTION: Performed by: NEUROLOGICAL SURGERY

## 2017-10-24 PROCEDURE — G0378 HOSPITAL OBSERVATION PER HR: HCPCS

## 2017-10-24 RX ORDER — ONDANSETRON 2 MG/ML
4 INJECTION INTRAMUSCULAR; INTRAVENOUS EVERY 6 HOURS PRN
Status: DISCONTINUED | OUTPATIENT
Start: 2017-10-24 | End: 2017-10-27 | Stop reason: HOSPADM

## 2017-10-24 RX ORDER — FAMOTIDINE 20 MG/1
20 TABLET, FILM COATED ORAL ONCE
Status: COMPLETED | OUTPATIENT
Start: 2017-10-24 | End: 2017-10-24

## 2017-10-24 RX ORDER — PROMETHAZINE HYDROCHLORIDE 25 MG/ML
12.5 INJECTION, SOLUTION INTRAMUSCULAR; INTRAVENOUS ONCE
Status: COMPLETED | OUTPATIENT
Start: 2017-10-24 | End: 2017-10-24

## 2017-10-24 RX ORDER — ALBUTEROL SULFATE 2.5 MG/3ML
2.5 SOLUTION RESPIRATORY (INHALATION) EVERY 6 HOURS PRN
Status: DISCONTINUED | OUTPATIENT
Start: 2017-10-24 | End: 2017-10-24

## 2017-10-24 RX ORDER — HYDROMORPHONE HYDROCHLORIDE 1 MG/ML
0.5 INJECTION, SOLUTION INTRAMUSCULAR; INTRAVENOUS; SUBCUTANEOUS
Status: COMPLETED | OUTPATIENT
Start: 2017-10-24 | End: 2017-10-24

## 2017-10-24 RX ADMIN — HYDROMORPHONE HYDROCHLORIDE 0.5 MG: 1 INJECTION, SOLUTION INTRAMUSCULAR; INTRAVENOUS; SUBCUTANEOUS at 01:49

## 2017-10-24 RX ADMIN — CEFAZOLIN SODIUM 2 G: 2 INJECTION, SOLUTION INTRAVENOUS at 01:20

## 2017-10-24 RX ADMIN — METHADONE HYDROCHLORIDE 10 MG: 10 TABLET ORAL at 06:21

## 2017-10-24 RX ADMIN — ALBUTEROL SULFATE 2.5 MG: 2.5 SOLUTION RESPIRATORY (INHALATION) at 13:19

## 2017-10-24 RX ADMIN — HYDROMORPHONE HYDROCHLORIDE 0.5 MG: 1 INJECTION, SOLUTION INTRAMUSCULAR; INTRAVENOUS; SUBCUTANEOUS at 19:24

## 2017-10-24 RX ADMIN — DESMOPRESSIN ACETATE 100 MCG: 0.2 TABLET ORAL at 09:00

## 2017-10-24 RX ADMIN — DESMOPRESSIN ACETATE 100 MCG: 0.2 TABLET ORAL at 17:09

## 2017-10-24 RX ADMIN — POLYETHYLENE GLYCOL 3350 17 G: 17 POWDER, FOR SOLUTION ORAL at 09:00

## 2017-10-24 RX ADMIN — HYDROCODONE BITARTRATE AND ACETAMINOPHEN 1 TABLET: 10; 325 TABLET ORAL at 07:06

## 2017-10-24 RX ADMIN — PROMETHAZINE HYDROCHLORIDE 12.5 MG: 25 INJECTION INTRAMUSCULAR; INTRAVENOUS at 06:22

## 2017-10-24 RX ADMIN — METHADONE HYDROCHLORIDE 10 MG: 10 TABLET ORAL at 13:35

## 2017-10-24 RX ADMIN — DIAZEPAM 5 MG: 5 TABLET ORAL at 09:08

## 2017-10-24 RX ADMIN — DIAZEPAM 5 MG: 5 TABLET ORAL at 01:52

## 2017-10-24 RX ADMIN — SODIUM CHLORIDE, POTASSIUM CHLORIDE, SODIUM LACTATE AND CALCIUM CHLORIDE 75 ML/HR: 600; 310; 30; 20 INJECTION, SOLUTION INTRAVENOUS at 09:55

## 2017-10-24 RX ADMIN — FAMOTIDINE 20 MG: 20 TABLET, FILM COATED ORAL at 01:49

## 2017-10-24 RX ADMIN — HYDROCODONE BITARTRATE AND ACETAMINOPHEN 1 TABLET: 10; 325 TABLET ORAL at 14:34

## 2017-10-24 RX ADMIN — CEFAZOLIN SODIUM 2 G: 2 INJECTION, SOLUTION INTRAVENOUS at 09:59

## 2017-10-24 RX ADMIN — ALBUTEROL SULFATE 2.5 MG: 2.5 SOLUTION RESPIRATORY (INHALATION) at 19:53

## 2017-10-24 RX ADMIN — METHADONE HYDROCHLORIDE 10 MG: 10 TABLET ORAL at 21:38

## 2017-10-24 NOTE — PLAN OF CARE
Problem: Perioperative Period (Adult)  Goal: Signs and Symptoms of Listed Potential Problems Will be Absent or Manageable (Perioperative Period)  Outcome: Ongoing (interventions implemented as appropriate)    10/24/17 0446   Perioperative Period   Problems Assessed (Perioperative Period) all   Problems Present (Perioperative Period) pain

## 2017-10-24 NOTE — PROGRESS NOTES
"NEUROSURGERY PROGRESS NOTE     LOS: 0 days   Patient Care Team:  Zachary Reddy DO as PCP - General  Rashid Cardona MD as PCP - Claims Attributed  Cheikh Alves MD as Consulting Physician (Anesthesiology)  Ever Samaniego MD as Consulting Physician (Neurosurgery)  Dee Mccabe PA-C as Physician Assistant (Physician Assistant)    Chief Complaint: Chronic back and leg pain.    POD#: 1 Day Post-Op  Procedures:  T9 laminotomy for placement of St. Trung epidural spinal cord stimulator.    Interval History:   The patient is voiding in frequent small amounts.  She has ambulated in her room on multiple occasions.  Patient Complaints: Abdominal pain, but improved substantially from last evening.  Patient Denies: Weakness, numbness.    Vital Signs: Blood pressure 157/79, pulse 80, temperature 97.7 °F (36.5 °C), temperature source Temporal Artery , resp. rate 20, height 66.5\" (168.9 cm), weight 220 lb (99.8 kg), SpO2 97 %.  Intake/Output:   Intake/Output Summary (Last 24 hours) at 10/24/17 0633  Last data filed at 10/24/17 0628   Gross per 24 hour   Intake             1500 ml   Output             1000 ml   Net              500 ml       Physical Exam:  The patient is awake, alert, and sitting up in bed.  Motor and sensory function are intact.       Assessment/Plan:  1.  Chronic back and leg pain status post epidural spinal cord stimulator placement.  2.  Postoperative abdominal pain: Improved.  Continue current medications  3.  Urinary retention: The patient has required intermittent straight catheterization.  3.  Disposition: Home once the patient is voiding independently.      Ever Samaniego MD  10/24/17  6:33 AM    "

## 2017-10-24 NOTE — PROGRESS NOTES
Discharge Planning Assessment  [facility]     Patient Name: Una Villarreal  MRN: [id number]  Today's Date: 10/24/2017    Admit Date: 10/23/2017          Discharge Needs Assessment       10/24/17 1044    Living Environment    Lives With spouse    Living Arrangements house   [place] story [place] in [facility]    Home Accessibility bed and bath on same level    Stair Railings at Home none    Type of Financial/Environmental Concern none    Transportation Available car;family or friend will provide    Living Environment    Provides Primary Care For no one    Quality Of Family Relationships supportive;involved;helpful    Able to Return to Prior Living Arrangements yes    Discharge Needs Assessment    Concerns To Be Addressed denies needs/concerns at this time    Anticipated Changes Related to Illness inability to care for self    Equipment Currently Used at Home respiratory supplies   Has a Contorion machine with [employer].     Equipment Needed After Discharge none    Discharge Disposition still a patient    Discharge Contact Information if Applicable [phone]            Discharge Plan       10/24/17 1046    Case Management/Social Work Plan    Plan Home with spouse    Patient/Family In Agreement With Plan yes    Additional Comments Met with pt at . She was independent of ADL's prior to surgery. She denies DME or HH. She plans to return home with assistance from her . Denies any needs. CM will cont to follow.         Discharge Placement     No information found        Expected Discharge Date and Time     Expected Discharge Date Expected Discharge Time    Oct 23, 2017               Demographic Summary       10/24/17 1043    Referral Information    Reason For Consult discharge planning    Contact Information    Permission Granted to Share Information With     Primary Care Physician Information    Name Dr. Zachary Reddy            Functional Status       10/24/17 1043    Functional Status Prior     Ambulation 0-->independent    Transferring 0-->independent    Toileting 0-->independent    Bathing 0-->independent    Dressing 0-->independent    Eating 0-->independent    Communication 0-->understands/communicates without difficulty    Swallowing 0-->swallows foods/liquids without difficulty    IADL    Medications independent    Meal Preparation independent    Housekeeping independent    Laundry independent    Shopping independent    Oral Care independent    Activity Tolerance    Current Activity Limitations none    Usual Activity Tolerance good    Current Activity Tolerance poor    Employment/Financial    Employment/Finance Comments [place] and [employer] [place] [place] with prescription coverage that is affordable.             Psychosocial     None            Abuse/Neglect     None            Legal     None            Substance Abuse     None            Patient Forms     None          Aneta Mckoy

## 2017-10-24 NOTE — PLAN OF CARE
Problem: Patient Care Overview (Adult)  Goal: Plan of Care Review  Outcome: Ongoing (interventions implemented as appropriate)    10/24/17 1604   Coping/Psychosocial Response Interventions   Plan Of Care Reviewed With patient   Patient Care Overview   Progress no change   Outcome Evaluation   Outcome Summary/Follow up Plan VSS, pain controlled with prn pain meds. Paitient unable to void spontaneously, straight cathing patient intermitently.          Problem: Perioperative Period (Adult)  Goal: Signs and Symptoms of Listed Potential Problems Will be Absent or Manageable (Perioperative Period)  Outcome: Outcome(s) achieved Date Met:  10/24/17    10/24/17 1604   Perioperative Period   Problems Assessed (Perioperative Period) all   Problems Present (Perioperative Period) pain;urinary retention

## 2017-10-25 LAB
ANION GAP SERPL CALCULATED.3IONS-SCNC: 5 MMOL/L (ref 3–11)
BUN BLD-MCNC: 16 MG/DL (ref 9–23)
BUN/CREAT SERPL: 20 (ref 7–25)
CALCIUM SPEC-SCNC: 8.8 MG/DL (ref 8.7–10.4)
CHLORIDE SERPL-SCNC: 90 MMOL/L (ref 99–109)
CO2 SERPL-SCNC: 24 MMOL/L (ref 20–31)
CREAT BLD-MCNC: 0.8 MG/DL (ref 0.6–1.3)
DEPRECATED RDW RBC AUTO: 47.4 FL (ref 37–54)
ERYTHROCYTE [DISTWIDTH] IN BLOOD BY AUTOMATED COUNT: 13.3 % (ref 11.3–14.5)
GFR SERPL CREATININE-BSD FRML MDRD: 72 ML/MIN/1.73
GLUCOSE BLD-MCNC: 122 MG/DL (ref 70–100)
HCT VFR BLD AUTO: 31.7 % (ref 34.5–44)
HGB BLD-MCNC: 10.4 G/DL (ref 11.5–15.5)
MCH RBC QN AUTO: 31.5 PG (ref 27–31)
MCHC RBC AUTO-ENTMCNC: 32.8 G/DL (ref 32–36)
MCV RBC AUTO: 96.1 FL (ref 80–99)
PLATELET # BLD AUTO: 204 10*3/MM3 (ref 150–450)
PMV BLD AUTO: 9.3 FL (ref 6–12)
POTASSIUM BLD-SCNC: 4 MMOL/L (ref 3.5–5.5)
RBC # BLD AUTO: 3.3 10*6/MM3 (ref 3.89–5.14)
SODIUM BLD-SCNC: 119 MMOL/L (ref 132–146)
SODIUM BLD-SCNC: 119 MMOL/L (ref 132–146)
WBC NRBC COR # BLD: 6.97 10*3/MM3 (ref 3.5–10.8)

## 2017-10-25 PROCEDURE — 25010000002 HEPARIN (PORCINE) PER 1000 UNITS: Performed by: NEUROLOGICAL SURGERY

## 2017-10-25 PROCEDURE — 85027 COMPLETE CBC AUTOMATED: CPT | Performed by: PHYSICIAN ASSISTANT

## 2017-10-25 PROCEDURE — 84295 ASSAY OF SERUM SODIUM: CPT | Performed by: PHYSICIAN ASSISTANT

## 2017-10-25 PROCEDURE — G0378 HOSPITAL OBSERVATION PER HR: HCPCS

## 2017-10-25 PROCEDURE — 80048 BASIC METABOLIC PNL TOTAL CA: CPT | Performed by: PHYSICIAN ASSISTANT

## 2017-10-25 PROCEDURE — 25010000002 ONDANSETRON PER 1 MG: Performed by: PHYSICIAN ASSISTANT

## 2017-10-25 PROCEDURE — 99024 POSTOP FOLLOW-UP VISIT: CPT | Performed by: PHYSICIAN ASSISTANT

## 2017-10-25 PROCEDURE — 94799 UNLISTED PULMONARY SVC/PX: CPT

## 2017-10-25 RX ORDER — HEPARIN SODIUM 5000 [USP'U]/ML
5000 INJECTION, SOLUTION INTRAVENOUS; SUBCUTANEOUS EVERY 8 HOURS SCHEDULED
Status: DISCONTINUED | OUTPATIENT
Start: 2017-10-25 | End: 2017-10-27 | Stop reason: HOSPADM

## 2017-10-25 RX ORDER — 3% SODIUM CHLORIDE 3 G/100ML
25 INJECTION, SOLUTION INTRAVENOUS ONCE
Status: COMPLETED | OUTPATIENT
Start: 2017-10-25 | End: 2017-10-25

## 2017-10-25 RX ORDER — 3% SODIUM CHLORIDE 3 G/100ML
25 INJECTION, SOLUTION INTRAVENOUS ONCE
Status: DISCONTINUED | OUTPATIENT
Start: 2017-10-25 | End: 2017-10-25

## 2017-10-25 RX ADMIN — METHADONE HYDROCHLORIDE 10 MG: 10 TABLET ORAL at 05:11

## 2017-10-25 RX ADMIN — SODIUM CHLORIDE 25 ML/HR: 3 INJECTION, SOLUTION INTRAVENOUS at 17:48

## 2017-10-25 RX ADMIN — DESMOPRESSIN ACETATE 100 MCG: 0.2 TABLET ORAL at 08:36

## 2017-10-25 RX ADMIN — ALBUTEROL SULFATE 2.5 MG: 2.5 SOLUTION RESPIRATORY (INHALATION) at 04:46

## 2017-10-25 RX ADMIN — HYDROCODONE BITARTRATE AND ACETAMINOPHEN 1 TABLET: 10; 325 TABLET ORAL at 18:05

## 2017-10-25 RX ADMIN — ONDANSETRON 4 MG: 2 INJECTION INTRAMUSCULAR; INTRAVENOUS at 00:41

## 2017-10-25 RX ADMIN — HEPARIN SODIUM 5000 UNITS: 5000 INJECTION, SOLUTION INTRAVENOUS; SUBCUTANEOUS at 06:47

## 2017-10-25 RX ADMIN — POLYETHYLENE GLYCOL 3350 17 G: 17 POWDER, FOR SOLUTION ORAL at 08:36

## 2017-10-25 RX ADMIN — HYDROCODONE BITARTRATE AND ACETAMINOPHEN 1 TABLET: 10; 325 TABLET ORAL at 10:29

## 2017-10-25 RX ADMIN — HEPARIN SODIUM 5000 UNITS: 5000 INJECTION, SOLUTION INTRAVENOUS; SUBCUTANEOUS at 15:38

## 2017-10-25 RX ADMIN — HEPARIN SODIUM 5000 UNITS: 5000 INJECTION, SOLUTION INTRAVENOUS; SUBCUTANEOUS at 21:26

## 2017-10-25 RX ADMIN — ONDANSETRON 4 MG: 2 INJECTION INTRAMUSCULAR; INTRAVENOUS at 23:18

## 2017-10-25 RX ADMIN — METHADONE HYDROCHLORIDE 10 MG: 10 TABLET ORAL at 21:26

## 2017-10-25 RX ADMIN — DESMOPRESSIN ACETATE 100 MCG: 0.2 TABLET ORAL at 19:28

## 2017-10-25 NOTE — CONSULTS
Consults    Patient Care Team:  Zachary Reddy DO as PCP - General  Rashid Cardona MD as PCP - Claims Attributed  Cheikh Alves MD as Consulting Physician (Anesthesiology)  Ever Samaniego MD as Consulting Physician (Neurosurgery)  Dee Mccabe PA-C as Physician Assistant (Physician Assistant)    Chief complaint: Post op urinary retention    Subjective     History of Present Illness pt  Is three days s/p placement of pain pump placement.  Since that time she has had difficulty voiding.  She denies voiding issues prior.   No UTI. No nocturia. She was I/O cathed last evening and void 100ml earlier today but none since despite several attempts.   Bladder scan now 446ml.  States no BM for 6 days but usually every third day with the use of laxatives. She Also has dx of diabetes insipidus and today noted to have sodium of 117.    Review of Systems         Objective      Vital Signs  Temp:  [98 °F (36.7 °C)-99.4 °F (37.4 °C)] 98.1 °F (36.7 °C)  Heart Rate:  [78-89] 82  Resp:  [16-20] 16  BP: (118-152)/(45-96) 130/96    Physical Exam    Results Review:    I reviewed the patient's new clinical results.        Assessment/Plan     Principal Problem:    Facet arthritis of lumbar region      Assessment & Plan  Post op urinary retention, will place plunkett and leave in until ambulatory and operative pain improved. Will follow    I discussed the patients findings and my recommendations with pt and nursing    Ankush Bojorquez MD  10/25/17  6:40 PM    Time: 20 min

## 2017-10-25 NOTE — PLAN OF CARE
Problem: Patient Care Overview (Adult)  Goal: Plan of Care Review  Outcome: Ongoing (interventions implemented as appropriate)    10/25/17 0352   Coping/Psychosocial Response Interventions   Plan Of Care Reviewed With patient;significant other   Patient Care Overview   Progress no change   Outcome Evaluation   Outcome Summary/Follow up Plan Pt VSS. Pt c/o low back pain. Pt had some nausea with dry heaving. Pt wheezing, non-productive cough. Pt still unable to void.          Problem: Pain, Acute (Adult)  Goal: Identify Related Risk Factors and Signs and Symptoms  Outcome: Ongoing (interventions implemented as appropriate)  Goal: Acceptable Pain Control/Comfort Level  Outcome: Ongoing (interventions implemented as appropriate)

## 2017-10-25 NOTE — PROGRESS NOTES
"NEUROSURGERY PROGRESS NOTE     LOS: 0 days   Patient Care Team:  Zachary Reddy DO as PCP - General  Rashid Cardona MD as PCP - Claims Attributed  Cheikh Alves MD as Consulting Physician (Anesthesiology)  Ever Samaniego MD as Consulting Physician (Neurosurgery)  Dee Mccabe PA-C as Physician Assistant (Physician Assistant)    Chief Complaint: Chronic back and leg pain.    POD#: 2 Days Post-Op  Procedures:  T9 laminotomy for placement of St. Trung epidural spinal cord stimulator.    Interval History:   Ambulated in the silverio several times yesterday.  Patient Complaints: Ongoing abdominal pain but better.  Patient Denies: Weakness or numbness.    Vital Signs: Blood pressure 152/81, pulse 85, temperature 98 °F (36.7 °C), resp. rate 20, height 66.5\" (168.9 cm), weight 220 lb (99.8 kg), SpO2 96 %.  Intake/Output:   Intake/Output Summary (Last 24 hours) at 10/25/17 0618  Last data filed at 10/25/17 0330   Gross per 24 hour   Intake              300 ml   Output             1375 ml   Net            -1075 ml       Physical Exam:  The patient is awake, alert, cooperative.  Motor and sensory function are intact in her lower extremities.  Dressings are in place on her incisions.       Assessment/Plan:  1.  Chronic back and leg pain status post epidural spinal cord stimulator placement.  2.  Postoperative abdominal pain: Improved.  Continue current medications  3.  Urinary retention: The patient has required intermittent straight catheterization.  Will ask urology consult.  3.  Disposition: Home once the patient is voiding independently.      Ever Samaniego MD  10/25/17  6:18 AM    "

## 2017-10-25 NOTE — PROGRESS NOTES
Labs revealed sodium of 119. Will fluid restrict and repeat labs in the am.    Brook William PA-C

## 2017-10-26 LAB
ANION GAP SERPL CALCULATED.3IONS-SCNC: 8 MMOL/L (ref 3–11)
BUN BLD-MCNC: 10 MG/DL (ref 9–23)
BUN/CREAT SERPL: 16.7 (ref 7–25)
CALCIUM SPEC-SCNC: 8.8 MG/DL (ref 8.7–10.4)
CHLORIDE SERPL-SCNC: 90 MMOL/L (ref 99–109)
CO2 SERPL-SCNC: 25 MMOL/L (ref 20–31)
CREAT BLD-MCNC: 0.6 MG/DL (ref 0.6–1.3)
GFR SERPL CREATININE-BSD FRML MDRD: 100 ML/MIN/1.73
GLUCOSE BLD-MCNC: 102 MG/DL (ref 70–100)
POTASSIUM BLD-SCNC: 4.2 MMOL/L (ref 3.5–5.5)
SODIUM BLD-SCNC: 123 MMOL/L (ref 132–146)

## 2017-10-26 PROCEDURE — G0378 HOSPITAL OBSERVATION PER HR: HCPCS

## 2017-10-26 PROCEDURE — 25010000002 ONDANSETRON PER 1 MG: Performed by: PHYSICIAN ASSISTANT

## 2017-10-26 PROCEDURE — 25010000002 HEPARIN (PORCINE) PER 1000 UNITS: Performed by: NEUROLOGICAL SURGERY

## 2017-10-26 PROCEDURE — 80048 BASIC METABOLIC PNL TOTAL CA: CPT | Performed by: PHYSICIAN ASSISTANT

## 2017-10-26 RX ORDER — MAGNESIUM CARB/ALUMINUM HYDROX 105-160MG
296 TABLET,CHEWABLE ORAL ONCE AS NEEDED
Status: COMPLETED | OUTPATIENT
Start: 2017-10-26 | End: 2017-10-26

## 2017-10-26 RX ORDER — 3% SODIUM CHLORIDE 3 G/100ML
25 INJECTION, SOLUTION INTRAVENOUS ONCE
Status: COMPLETED | OUTPATIENT
Start: 2017-10-26 | End: 2017-10-26

## 2017-10-26 RX ORDER — BISACODYL 10 MG
10 SUPPOSITORY, RECTAL RECTAL DAILY PRN
Status: DISCONTINUED | OUTPATIENT
Start: 2017-10-26 | End: 2017-10-27 | Stop reason: HOSPADM

## 2017-10-26 RX ORDER — SENNA AND DOCUSATE SODIUM 50; 8.6 MG/1; MG/1
2 TABLET, FILM COATED ORAL 2 TIMES DAILY PRN
Status: DISCONTINUED | OUTPATIENT
Start: 2017-10-26 | End: 2017-10-27 | Stop reason: HOSPADM

## 2017-10-26 RX ORDER — NITROFURANTOIN 25; 75 MG/1; MG/1
100 CAPSULE ORAL EVERY 12 HOURS SCHEDULED
Status: DISCONTINUED | OUTPATIENT
Start: 2017-10-26 | End: 2017-10-27 | Stop reason: HOSPADM

## 2017-10-26 RX ORDER — BISACODYL 5 MG/1
10 TABLET, DELAYED RELEASE ORAL DAILY PRN
Status: DISCONTINUED | OUTPATIENT
Start: 2017-10-26 | End: 2017-10-27 | Stop reason: HOSPADM

## 2017-10-26 RX ADMIN — SODIUM CHLORIDE 25 ML/HR: 3 INJECTION, SOLUTION INTRAVENOUS at 12:43

## 2017-10-26 RX ADMIN — NITROFURANTOIN (MONOHYDRATE/MACROCRYSTALS) 100 MG: 75; 25 CAPSULE ORAL at 12:42

## 2017-10-26 RX ADMIN — HYDROCODONE BITARTRATE AND ACETAMINOPHEN 1 TABLET: 10; 325 TABLET ORAL at 18:04

## 2017-10-26 RX ADMIN — HEPARIN SODIUM 5000 UNITS: 5000 INJECTION, SOLUTION INTRAVENOUS; SUBCUTANEOUS at 14:49

## 2017-10-26 RX ADMIN — HYDROCODONE BITARTRATE AND ACETAMINOPHEN 1 TABLET: 10; 325 TABLET ORAL at 02:44

## 2017-10-26 RX ADMIN — Medication 296 ML: at 18:54

## 2017-10-26 RX ADMIN — ONDANSETRON 4 MG: 2 INJECTION INTRAMUSCULAR; INTRAVENOUS at 20:10

## 2017-10-26 RX ADMIN — NITROFURANTOIN (MONOHYDRATE/MACROCRYSTALS) 100 MG: 75; 25 CAPSULE ORAL at 20:23

## 2017-10-26 RX ADMIN — Medication 2 TABLET: at 11:17

## 2017-10-26 RX ADMIN — BISACODYL 10 MG: 5 TABLET, COATED ORAL at 11:17

## 2017-10-26 RX ADMIN — METHADONE HYDROCHLORIDE 10 MG: 10 TABLET ORAL at 20:09

## 2017-10-26 RX ADMIN — METHADONE HYDROCHLORIDE 10 MG: 10 TABLET ORAL at 06:03

## 2017-10-26 RX ADMIN — DESMOPRESSIN ACETATE 100 MCG: 0.2 TABLET ORAL at 18:54

## 2017-10-26 RX ADMIN — DESMOPRESSIN ACETATE 100 MCG: 0.2 TABLET ORAL at 09:21

## 2017-10-26 RX ADMIN — POLYETHYLENE GLYCOL 3350 17 G: 17 POWDER, FOR SOLUTION ORAL at 09:21

## 2017-10-26 RX ADMIN — METHADONE HYDROCHLORIDE 10 MG: 10 TABLET ORAL at 14:49

## 2017-10-26 RX ADMIN — ONDANSETRON 4 MG: 2 INJECTION INTRAMUSCULAR; INTRAVENOUS at 20:09

## 2017-10-26 RX ADMIN — HEPARIN SODIUM 5000 UNITS: 5000 INJECTION, SOLUTION INTRAVENOUS; SUBCUTANEOUS at 06:03

## 2017-10-26 RX ADMIN — BISACODYL 10 MG: 10 SUPPOSITORY RECTAL at 18:08

## 2017-10-26 NOTE — PROGRESS NOTES
"NEUROSURGERY PROGRESS NOTE     LOS: 0 days   Patient Care Team:  Zachary Reddy DO as PCP - General  Rashid Cardona MD as PCP - Claims Attributed  Cheikh Alves MD as Consulting Physician (Anesthesiology)  Ever Samaniego MD as Consulting Physician (Neurosurgery)  Dee Mccabe PA-C as Physician Assistant (Physician Assistant)    Chief Complaint: Chronic back and leg pain.    POD#: 3 Days Post-Op  Procedures:  T9 laminotomy for placement of [employer]. Trung epidural spinal cord stimulator.    Interval History:   The patient feels less \"foggy.\"  She is now voiding independently.  Patient Complaints: Ongoing abdominal pain, but improved.  Patient Denies: Weakness or numbness.    Vital Signs: Blood pressure 146/92, pulse 79, temperature 98.3 °F (36.8 °C), temperature source Oral, resp. rate 16, height 66.5\" (168.9 cm), weight 220 lb (99.8 kg), SpO2 96 %.  Intake/Output:   Intake/Output Summary (Last 24 hours) at 10/26/17 0656  Last data filed at 10/26/17 0539   Gross per 24 hour   Intake                0 ml   Output             1350 ml   Net            -1350 ml       Physical Exam:  The patient is awake, alert, and well-oriented.  She follows all commands.  Her speech is fluent.  She is able to sit up unassisted in bed.  Dry dressings are in place on her incisions.     Data Review:      Results from last 7 days  Lab Units 10/26/17  0500   SODIUM mmol/L 123*   POTASSIUM mmol/L 4.2   CHLORIDE mmol/L 90*   CO2 mmol/L 25.0   BUN mg/dL 10   CREATININE mg/dL 0.60   GLUCOSE mg/dL 102*   CALCIUM mg/dL 8.8       Assessment/Plan:  1.  Chronic back and leg pain status post epidural spinal cord stimulator placement.  2.  Postoperative abdominal pain: Improved.  Continue current medications  3.  Urinary retention:   Appreciate urology input.  Jansen catheter was avoided given that the patient has since been able to void independently.  4.  Hyponatremia: The patient had experienced this once before and developed similar " symptoms.  Continue 3% saline and fluid restriction.  The patient reports that her sodium usually runs around 120.  3.  Disposition: Hopefully if her sodium stabilizes she can be discharged home tomorrow.         Ever Samaniego MD  10/26/17  6:56 AM

## 2017-10-26 NOTE — PROGRESS NOTES
Pt voided 700ml after my visit last evening, therefore plunkett not anchored.   Voided small amount so far today , pain better  Hyponatremia  rx  Impression, post op AUR  Improving, will have nursing check bladder scan PVR If voiding well will see in f/u 2 weeks

## 2017-10-26 NOTE — PLAN OF CARE
Problem: Patient Care Overview (Adult)  Goal: Plan of Care Review  Outcome: Ongoing (interventions implemented as appropriate)    10/26/17 1721   Coping/Psychosocial Response Interventions   Plan Of Care Reviewed With patient   Patient Care Overview   Progress progress towards functional goals is fair   Outcome Evaluation   Outcome Summary/Follow up Plan Patient's sodium level up this morning to 123. Continuing 3% sodium infusion. Back pain improving. Patient was able to void last night,however had to be straight cathed today after a 536mL PVR. She has not had a BM since 10/20. Medication regimen utilized for bowel function, no results thus far. VSS. Will continue to monitor.

## 2017-10-27 VITALS
OXYGEN SATURATION: 95 % | HEIGHT: 67 IN | BODY MASS INDEX: 34.53 KG/M2 | DIASTOLIC BLOOD PRESSURE: 86 MMHG | WEIGHT: 220 LBS | RESPIRATION RATE: 18 BRPM | HEART RATE: 92 BPM | TEMPERATURE: 97.6 F | SYSTOLIC BLOOD PRESSURE: 114 MMHG

## 2017-10-27 LAB
ANION GAP SERPL CALCULATED.3IONS-SCNC: 12 MMOL/L (ref 3–11)
BUN BLD-MCNC: 14 MG/DL (ref 9–23)
BUN/CREAT SERPL: 20 (ref 7–25)
CALCIUM SPEC-SCNC: 8.8 MG/DL (ref 8.7–10.4)
CHLORIDE SERPL-SCNC: 91 MMOL/L (ref 99–109)
CO2 SERPL-SCNC: 25 MMOL/L (ref 20–31)
CREAT BLD-MCNC: 0.7 MG/DL (ref 0.6–1.3)
GFR SERPL CREATININE-BSD FRML MDRD: 84 ML/MIN/1.73
GLUCOSE BLD-MCNC: 99 MG/DL (ref 70–100)
POTASSIUM BLD-SCNC: 4.3 MMOL/L (ref 3.5–5.5)
SODIUM BLD-SCNC: 128 MMOL/L (ref 132–146)

## 2017-10-27 PROCEDURE — 25010000002 ONDANSETRON PER 1 MG: Performed by: PHYSICIAN ASSISTANT

## 2017-10-27 PROCEDURE — G0378 HOSPITAL OBSERVATION PER HR: HCPCS

## 2017-10-27 PROCEDURE — 99024 POSTOP FOLLOW-UP VISIT: CPT | Performed by: PHYSICIAN ASSISTANT

## 2017-10-27 PROCEDURE — 80048 BASIC METABOLIC PNL TOTAL CA: CPT | Performed by: NEUROLOGICAL SURGERY

## 2017-10-27 PROCEDURE — 94640 AIRWAY INHALATION TREATMENT: CPT

## 2017-10-27 PROCEDURE — 25010000002 HEPARIN (PORCINE) PER 1000 UNITS: Performed by: NEUROLOGICAL SURGERY

## 2017-10-27 RX ORDER — MAGNESIUM CARB/ALUMINUM HYDROX 105-160MG
150 TABLET,CHEWABLE ORAL ONCE
Status: COMPLETED | OUTPATIENT
Start: 2017-10-27 | End: 2017-10-27

## 2017-10-27 RX ORDER — NITROFURANTOIN 25; 75 MG/1; MG/1
100 CAPSULE ORAL 2 TIMES DAILY
Qty: 14 CAPSULE | Refills: 0 | Status: SHIPPED | OUTPATIENT
Start: 2017-10-27 | End: 2017-12-12

## 2017-10-27 RX ORDER — METHADONE HYDROCHLORIDE 10 MG/1
10 TABLET ORAL EVERY 8 HOURS SCHEDULED
Qty: 21 TABLET | Refills: 0 | Status: SHIPPED | OUTPATIENT
Start: 2017-10-27 | End: 2017-11-28 | Stop reason: SDDI

## 2017-10-27 RX ORDER — SENNA AND DOCUSATE SODIUM 50; 8.6 MG/1; MG/1
2 TABLET, FILM COATED ORAL DAILY
Qty: 60 TABLET | Refills: 1 | Status: SHIPPED | OUTPATIENT
Start: 2017-10-27

## 2017-10-27 RX ADMIN — Medication 2 TABLET: at 08:50

## 2017-10-27 RX ADMIN — HEPARIN SODIUM 5000 UNITS: 5000 INJECTION, SOLUTION INTRAVENOUS; SUBCUTANEOUS at 06:01

## 2017-10-27 RX ADMIN — POLYETHYLENE GLYCOL 3350 17 G: 17 POWDER, FOR SOLUTION ORAL at 10:20

## 2017-10-27 RX ADMIN — METHADONE HYDROCHLORIDE 10 MG: 10 TABLET ORAL at 06:01

## 2017-10-27 RX ADMIN — DESMOPRESSIN ACETATE 100 MCG: 0.2 TABLET ORAL at 10:22

## 2017-10-27 RX ADMIN — Medication 150 ML: at 09:58

## 2017-10-27 RX ADMIN — ALBUTEROL SULFATE 2.5 MG: 2.5 SOLUTION RESPIRATORY (INHALATION) at 11:15

## 2017-10-27 RX ADMIN — HYDROCODONE BITARTRATE AND ACETAMINOPHEN 1 TABLET: 10; 325 TABLET ORAL at 02:21

## 2017-10-27 RX ADMIN — BISACODYL 10 MG: 10 SUPPOSITORY RECTAL at 08:50

## 2017-10-27 RX ADMIN — NITROFURANTOIN (MONOHYDRATE/MACROCRYSTALS) 100 MG: 75; 25 CAPSULE ORAL at 10:21

## 2017-10-27 RX ADMIN — ONDANSETRON 4 MG: 2 INJECTION INTRAMUSCULAR; INTRAVENOUS at 08:38

## 2017-10-27 NOTE — PROGRESS NOTES
"NEUROSURGERY PROGRESS NOTE     LOS: 0 days   Patient Care Team:  Zachary Reddy DO as PCP - General  Rashid Cardona MD as PCP - Claims Attributed  Cheikh Alves MD as Consulting Physician (Anesthesiology)  Ever Samaniego MD as Consulting Physician (Neurosurgery)  Dee Mccabe PA-C as Physician Assistant (Physician Assistant)    Chief Complaint: Chronic back and leg pain.    POD#: 4 Days Post-Op  Procedures:  T9 laminotomy for placement of St. Trung epidural spinal cord stimulator.    Interval History:   Patient Complaints: Ongoing constipation and mild abdominal pain.  Patient Denies: Weakness, numbness, or voiding difficulty.    Vital Signs: Blood pressure 137/88, pulse 88, temperature 97.5 °F (36.4 °C), temperature source Oral, resp. rate 18, height 66.5\" (168.9 cm), weight 220 lb (99.8 kg), SpO2 95 %.  Intake/Output:   Intake/Output Summary (Last 24 hours) at 10/27/17 0703  Last data filed at 10/26/17 1500   Gross per 24 hour   Intake                0 ml   Output              550 ml   Net             -550 ml   The patient had a small BM.    Physical Exam:  The patient is awake, alert, and sitting up in bed.  She appears much more comfortable.  Dry dressings are in place on her incisions.     Data Review:      Results from last 7 days  Lab Units 10/26/17  0500   SODIUM mmol/L 123*   POTASSIUM mmol/L 4.2   CHLORIDE mmol/L 90*   CO2 mmol/L 25.0   BUN mg/dL 10   CREATININE mg/dL 0.60   GLUCOSE mg/dL 102*   CALCIUM mg/dL 8.8         Assessment/Plan:  1.  Chronic back and leg pain status post epidural spinal cord stimulator placement.  2.  Postoperative abdominal pain: Improved.  Continue current medications  3.  Urinary retention:   Appreciate urology input.  Jansen catheter was avoided given that the patient has since been able to void independently.  She continues to void independently.  4.  Hyponatremia: The patient had experienced this once before and developed similar symptoms. The patient " reports that her sodium usually runs around 120.  Discontinue 3% saline.  3.  Disposition: Bowel regimen.  Mobilize.  Home this afternoon.      Ever Samaniego MD  10/27/17  7:03 AM

## 2017-10-27 NOTE — NURSING NOTE
Pt discharged home with assist of spouse.  Walker delivered to room, personal belongings in hand, IV removed, telemetry removed, discharge packet in hand, no questions and concerns at this time.   Spoke with VESNA Coleman confirm discharge order and explained that patient did have several small bowel movements.  Adeel Carcamo RN

## 2017-10-27 NOTE — PROGRESS NOTES
Continued Stay Note  [facility]     Patient Name: Una Villarreal  MRN: [id number]  Today's Date: 10/27/2017    Admit Date: 10/23/2017          Discharge Plan       10/27/17 1405    Case Management/Social Work Plan    Plan Home    Patient/Family In Agreement With Plan yes    Additional Comments Per patient request a referral has been made for a RW to [facility]. Luz [facility] will deliver to patient's room today prior to dc. CM will follow.               Discharge Codes     None        Expected Discharge Date and Time     Expected Discharge Date Expected Discharge Time    Oct 30, 2017             Gabrielle Luevano RN

## 2017-11-05 ENCOUNTER — APPOINTMENT (OUTPATIENT)
Dept: CT IMAGING | Facility: HOSPITAL | Age: 67
End: 2017-11-05

## 2017-11-05 ENCOUNTER — HOSPITAL ENCOUNTER (EMERGENCY)
Facility: HOSPITAL | Age: 67
Discharge: HOME | End: 2017-11-05
Attending: EMERGENCY MEDICINE | Admitting: EMERGENCY MEDICINE

## 2017-11-05 VITALS
HEIGHT: 67 IN | SYSTOLIC BLOOD PRESSURE: 154 MMHG | OXYGEN SATURATION: 98 % | TEMPERATURE: 98.6 F | WEIGHT: 220 LBS | BODY MASS INDEX: 34.53 KG/M2 | RESPIRATION RATE: 14 BRPM | DIASTOLIC BLOOD PRESSURE: 94 MMHG | HEART RATE: 76 BPM

## 2017-11-05 DIAGNOSIS — G89.18 PAIN, POSTOPERATIVE, ACUTE: Primary | ICD-10-CM

## 2017-11-05 LAB
ALBUMIN SERPL-MCNC: 4.2 G/DL (ref 3.2–4.8)
ALBUMIN/GLOB SERPL: 1.4 G/DL (ref 1.5–2.5)
ALP SERPL-CCNC: 79 U/L (ref 25–100)
ALT SERPL W P-5'-P-CCNC: 19 U/L (ref 7–40)
ANION GAP SERPL CALCULATED.3IONS-SCNC: 6 MMOL/L (ref 3–11)
AST SERPL-CCNC: 20 U/L (ref 0–33)
BACTERIA UR QL AUTO: ABNORMAL /HPF
BASOPHILS # BLD AUTO: 0.03 10*3/MM3 (ref 0–0.2)
BASOPHILS NFR BLD AUTO: 0.4 % (ref 0–1)
BILIRUB SERPL-MCNC: 0.3 MG/DL (ref 0.3–1.2)
BILIRUB UR QL STRIP: NEGATIVE
BUN BLD-MCNC: 11 MG/DL (ref 9–23)
BUN/CREAT SERPL: 15.7 (ref 7–25)
CALCIUM SPEC-SCNC: 9.6 MG/DL (ref 8.7–10.4)
CHLORIDE SERPL-SCNC: 103 MMOL/L (ref 99–109)
CLARITY UR: CLEAR
CO2 SERPL-SCNC: 23 MMOL/L (ref 20–31)
COLOR UR: YELLOW
CREAT BLD-MCNC: 0.7 MG/DL (ref 0.6–1.3)
DEPRECATED RDW RBC AUTO: 44.4 FL (ref 37–54)
EOSINOPHIL # BLD AUTO: 0.32 10*3/MM3 (ref 0–0.3)
EOSINOPHIL NFR BLD AUTO: 4.7 % (ref 0–3)
ERYTHROCYTE [DISTWIDTH] IN BLOOD BY AUTOMATED COUNT: 12.7 % (ref 11.3–14.5)
GFR SERPL CREATININE-BSD FRML MDRD: 84 ML/MIN/1.73
GLOBULIN UR ELPH-MCNC: 3 GM/DL
GLUCOSE BLD-MCNC: 130 MG/DL (ref 70–100)
GLUCOSE UR STRIP-MCNC: NEGATIVE MG/DL
HCT VFR BLD AUTO: 37.8 % (ref 34.5–44)
HGB BLD-MCNC: 12.4 G/DL (ref 11.5–15.5)
HGB UR QL STRIP.AUTO: NEGATIVE
HYALINE CASTS UR QL AUTO: ABNORMAL /LPF
IMM GRANULOCYTES # BLD: 0 10*3/MM3 (ref 0–0.03)
IMM GRANULOCYTES NFR BLD: 0 % (ref 0–0.6)
KETONES UR QL STRIP: NEGATIVE
LEUKOCYTE ESTERASE UR QL STRIP.AUTO: ABNORMAL
LYMPHOCYTES # BLD AUTO: 1.03 10*3/MM3 (ref 0.6–4.8)
LYMPHOCYTES NFR BLD AUTO: 15.1 % (ref 24–44)
MCH RBC QN AUTO: 31.2 PG (ref 27–31)
MCHC RBC AUTO-ENTMCNC: 32.8 G/DL (ref 32–36)
MCV RBC AUTO: 95 FL (ref 80–99)
MONOCYTES # BLD AUTO: 0.44 10*3/MM3 (ref 0–1)
MONOCYTES NFR BLD AUTO: 6.4 % (ref 0–12)
NEUTROPHILS # BLD AUTO: 5.02 10*3/MM3 (ref 1.5–8.3)
NEUTROPHILS NFR BLD AUTO: 73.4 % (ref 41–71)
NITRITE UR QL STRIP: NEGATIVE
PH UR STRIP.AUTO: 7.5 [PH] (ref 5–8)
PLATELET # BLD AUTO: 370 10*3/MM3 (ref 150–450)
PMV BLD AUTO: 9.3 FL (ref 6–12)
POTASSIUM BLD-SCNC: 4.2 MMOL/L (ref 3.5–5.5)
PROT SERPL-MCNC: 7.2 G/DL (ref 5.7–8.2)
PROT UR QL STRIP: NEGATIVE
RBC # BLD AUTO: 3.98 10*6/MM3 (ref 3.89–5.14)
RBC # UR: ABNORMAL /HPF
REF LAB TEST METHOD: ABNORMAL
SODIUM BLD-SCNC: 132 MMOL/L (ref 132–146)
SP GR UR STRIP: <=1.005 (ref 1–1.03)
SQUAMOUS #/AREA URNS HPF: ABNORMAL /HPF
UROBILINOGEN UR QL STRIP: ABNORMAL
WBC NRBC COR # BLD: 6.84 10*3/MM3 (ref 3.5–10.8)
WBC UR QL AUTO: ABNORMAL /HPF

## 2017-11-05 PROCEDURE — 99284 EMERGENCY DEPT VISIT MOD MDM: CPT

## 2017-11-05 PROCEDURE — 81001 URINALYSIS AUTO W/SCOPE: CPT | Performed by: EMERGENCY MEDICINE

## 2017-11-05 PROCEDURE — 96374 THER/PROPH/DIAG INJ IV PUSH: CPT

## 2017-11-05 PROCEDURE — 80053 COMPREHEN METABOLIC PANEL: CPT | Performed by: EMERGENCY MEDICINE

## 2017-11-05 PROCEDURE — 74176 CT ABD & PELVIS W/O CONTRAST: CPT

## 2017-11-05 PROCEDURE — 72128 CT CHEST SPINE W/O DYE: CPT

## 2017-11-05 PROCEDURE — 87086 URINE CULTURE/COLONY COUNT: CPT | Performed by: EMERGENCY MEDICINE

## 2017-11-05 PROCEDURE — 85025 COMPLETE CBC W/AUTO DIFF WBC: CPT | Performed by: EMERGENCY MEDICINE

## 2017-11-05 PROCEDURE — 25010000002 MORPHINE PER 10 MG: Performed by: EMERGENCY MEDICINE

## 2017-11-05 RX ORDER — SODIUM CHLORIDE 0.9 % (FLUSH) 0.9 %
10 SYRINGE (ML) INJECTION AS NEEDED
Status: DISCONTINUED | OUTPATIENT
Start: 2017-11-05 | End: 2017-11-05 | Stop reason: HOSPADM

## 2017-11-05 RX ORDER — MORPHINE SULFATE 2 MG/ML
4 INJECTION, SOLUTION INTRAMUSCULAR; INTRAVENOUS ONCE
Status: DISCONTINUED | OUTPATIENT
Start: 2017-11-05 | End: 2017-11-05

## 2017-11-05 RX ORDER — HYDROCODONE BITARTRATE AND ACETAMINOPHEN 10; 325 MG/1; MG/1
1 TABLET ORAL 3 TIMES DAILY PRN
Qty: 10 TABLET | Refills: 0 | Status: SHIPPED | OUTPATIENT
Start: 2017-11-05 | End: 2017-11-13 | Stop reason: DRUGHIGH

## 2017-11-05 RX ORDER — MORPHINE SULFATE 4 MG/ML
4 INJECTION, SOLUTION INTRAMUSCULAR; INTRAVENOUS ONCE
Status: COMPLETED | OUTPATIENT
Start: 2017-11-05 | End: 2017-11-05

## 2017-11-05 RX ADMIN — MORPHINE SULFATE 4 MG: 4 INJECTION, SOLUTION INTRAMUSCULAR; INTRAVENOUS at 07:29

## 2017-11-05 NOTE — ED PROVIDER NOTES
Subjective   HPI Comments: 67-year-old female presents with complaint of pain over her nerve stimulator.  The spinal nerve stimulator was placed approximately 2 weeks ago, she reports she has had pain in the area of the battery since it has been placed.   Reports the pain has been there since the time the surgery was performed, describes it as a burning sensation.  Denies any fever, no chest pain, no difficulty breathing, no abdominal pain.  No nausea or vomiting.  No change in bowel or urinary function.  Patient desires to have the  stimulator removed.    Patient is a 67 y.o. female presenting with back pain.   Back Pain   Location:  Thoracic spine  Quality:  Burning  Radiates to:  Does not radiate  Pain severity:  Moderate  Pain is:  Same all the time  Onset quality:  Gradual  Duration:  9 days  Timing:  Constant  Progression:  Unchanged  Chronicity:  New  Context comment:  Recent surgery  Relieved by:  Bed rest and being still  Worsened by:  Movement  Ineffective treatments:  None tried  Associated symptoms: abdominal pain    Associated symptoms: no bladder incontinence, no bowel incontinence, no chest pain, no dysuria, no fever, no headaches, no numbness, no tingling, no weakness and no weight loss    Risk factors: obesity and recent surgery    Risk factors: no hx of cancer and no steroid use        Review of Systems   Constitutional: Negative for chills, fatigue, fever and weight loss.   HENT: Negative for congestion, ear pain, postnasal drip, sinus pressure and sore throat.    Eyes: Negative for pain, redness and visual disturbance.   Respiratory: Negative for cough, chest tightness and shortness of breath.    Cardiovascular: Negative for chest pain, palpitations and leg swelling.   Gastrointestinal: Positive for abdominal pain. Negative for anal bleeding, blood in stool, bowel incontinence, diarrhea, nausea and vomiting.   Endocrine: Negative for polydipsia and polyuria.   Genitourinary: Negative for bladder  incontinence, difficulty urinating, dysuria, frequency and urgency.   Musculoskeletal: Positive for back pain (tenderness to palpation over left flank region, where nerve stimulator battery is in place. ). Negative for arthralgias and neck pain.   Skin: Negative for pallor and rash.   Allergic/Immunologic: Negative for environmental allergies and immunocompromised state.   Neurological: Negative for dizziness, tingling, weakness, numbness and headaches.   Hematological: Negative for adenopathy.   Psychiatric/Behavioral: Negative for confusion, self-injury and suicidal ideas. The patient is not nervous/anxious.    All other systems reviewed and are negative.      Past Medical History:   Diagnosis Date   • Alcohol dependence     continuous drinking   • Arthritis    • Asthma    • Back pain    • Depression    • Diabetes insipidus    • Disease of thyroid gland    • Encephalitis    • Facet syndrome, lumbar    • Generalized seizure 2/21/2017   • GERD (gastroesophageal reflux disease)    • Glaucoma     bilat eye   • Headache    • Hiatal hernia    • Hypertension    • Kidney stone     present but small- no discomfort at this time    • Lumbar canal stenosis    • Meniscus tear     right knee   • Migraine    • Pituitary cyst    • RA (rheumatoid arthritis)    • Seizure     only occured from mylogram-    • Tooth wear     tooth cap in place- upper front    • Wears glasses        Allergies   Allergen Reactions   • Aspirin Anaphylaxis   • Other Anaphylaxis     strawberries   • Bactrim [Sulfamethoxazole-Trimethoprim] GI Intolerance     And crying    • Barley Grass GI Intolerance   • Codeine Hives   • Eggs Or Egg-Derived Products GI Intolerance   • Percocet [Oxycodone-Acetaminophen] Nausea And Vomiting   • Trichophyton Other (See Comments)     Unsure reaction    • Latex Rash       Past Surgical History:   Procedure Laterality Date   • APPENDECTOMY     • BRAIN SURGERY  2007    pituitary tumor   • CARPAL TUNNEL RELEASE      left   •  "CARPAL TUNNEL RELEASE WITH CUBITAL TUNNEL RELEASE Right    • CHOLECYSTECTOMY     • COLONOSCOPY     • ELBOW PROCEDURE  1996    \"fish cutters elbow\" repair- left   • ENDOSCOPY     • HYSTERECTOMY      first partial , then later removed ovaries when tumor showed up    • KNEE CARTILAGE SURGERY  2005    right    • OOPHORECTOMY      bilat ovaries when found tumor so went back after partial hysterectomy    • SPINAL CORD STIMULATOR IMPLANT N/A 10/23/2017    Procedure: SPINAL CORD STIMULATOR INSERTION PHASE 1 [facility]. Trung;  Surgeon: Ever Samaniego MD;  Location: [facility];  Service:    • TONSILLECTOMY      unsure adnoids - age 7    • TRIGGER FINGER RELEASE Left 02/17/2017   • TUBAL ABDOMINAL LIGATION     • TUMOR REMOVAL  1990    fatty tumor on right shoulder   • WISDOM TOOTH EXTRACTION      x3 then later removed 4th        Family History   Problem Relation Age of Onset   • Lung cancer Maternal Grandmother    • Stroke Maternal Grandfather    • Lung cancer Maternal Grandfather    • Stroke Paternal Grandmother    • Brain cancer Other    • COPD Mother    • Heart failure Father        Social History     Social History   • Marital status:      Spouse name: N/A   • Number of children: N/A   • Years of education: N/A     Social History Main Topics   • Smoking status: Former Smoker     Packs/day: 1.00     Years: 25.00     Types: Cigarettes     Quit date: 2/15/1987   • Smokeless tobacco: Never Used      Comment: did get up to 3 ppd but didnt do it long    • Alcohol use Yes      Comment: socially- but tries to drink non alcoholic since when does drink just keeps going- doesnt feel effects of alcohol so keeps drinking    • Drug use: No   • Sexual activity: Defer     Other Topics Concern   • None     Social History Narrative           Objective   Physical Exam   Constitutional: She is oriented to person, place, and time. She appears well-developed and well-nourished.  Non-toxic appearance. No distress.   HENT:   Head: Normocephalic " and atraumatic.   Right Ear: External ear normal.   Left Ear: External ear normal.   Nose: Nose normal.   Eyes: EOM and lids are normal. Pupils are equal, round, and reactive to light.   Neck: Normal range of motion. Neck supple. No tracheal deviation present.   Cardiovascular: Normal rate, regular rhythm and normal heart sounds.  Exam reveals no gallop, no friction rub and no decreased pulses.    No murmur heard.  Pulmonary/Chest: Effort normal and breath sounds normal. No respiratory distress. She has no decreased breath sounds. She has no wheezes. She has no rhonchi. She has no rales.   Abdominal: Soft. Normal appearance and bowel sounds are normal. There is no tenderness. There is no rebound and no guarding.   Musculoskeletal: Normal range of motion. She exhibits no deformity.        Arms:  Lymphadenopathy:     She has no cervical adenopathy.   Neurological: She is alert and oriented to person, place, and time. She has normal strength. No cranial nerve deficit or sensory deficit.   Skin: Skin is warm and dry. No rash noted. She is not diaphoretic.   Psychiatric: She has a normal mood and affect. Her speech is normal and behavior is normal. Judgment and thought content normal. Cognition and memory are normal.   Nursing note and vitals reviewed.      Procedures         ED Course  ED Course                  MDM  Number of Diagnoses or Management Options  Pain, postoperative, acute: new and requires workup  Diagnosis management comments: Acute abdomen identified on lab evaluation or imaging evaluation.    I discussed the patient with Dr. Samaniego, who suggest pain medication orally, and outpatient follow-up on Tuesday in clinic.    Patient does report feeling better after receiving morphine, and is agreeable to the a fore mentioned plan.    I will give a further hydrocodone prescription per Dr. Peterson instructions.        Amount and/or Complexity of Data Reviewed  Clinical lab tests: ordered and reviewed  Tests in  the radiology section of CPT®: ordered and reviewed  Obtain history from someone other than the patient: yes  Review and summarize past medical records: yes  Discuss the patient with other providers: yes  Independent visualization of images, tracings, or specimens: yes    Patient Progress  Patient progress: stable      Final diagnoses:   Pain, postoperative, acute            Taylor Rosario MD  11/05/17 0829

## 2017-11-07 ENCOUNTER — OFFICE VISIT (OUTPATIENT)
Dept: NEUROSURGERY | Facility: CLINIC | Age: 67
End: 2017-11-07

## 2017-11-07 VITALS
WEIGHT: 220 LBS | DIASTOLIC BLOOD PRESSURE: 86 MMHG | BODY MASS INDEX: 34.53 KG/M2 | TEMPERATURE: 97.8 F | HEIGHT: 67 IN | SYSTOLIC BLOOD PRESSURE: 126 MMHG

## 2017-11-07 DIAGNOSIS — M47.816 LUMBAR FACET ARTHROPATHY: Primary | ICD-10-CM

## 2017-11-07 LAB
BACTERIA SPEC AEROBE CULT: NORMAL
BACTERIA SPEC AEROBE CULT: NORMAL

## 2017-11-07 PROCEDURE — 99024 POSTOP FOLLOW-UP VISIT: CPT | Performed by: PHYSICIAN ASSISTANT

## 2017-11-07 NOTE — PROGRESS NOTES
[facility]    Patient: Una Villarreal      Date: 17  : 1950   MRN: [id number]    PCP: Zachary Reddy, DO  ______________________________________________________________________    CHIEF COMPLIANT:   Low back and bilateral leg pain    HISTORY OF PRESENT ILLNESS  Ms. Villarreal is a 67 y.o. who presented with chronic back and bilateral lower extremity pain. She has undergone multiple rhizotomies, an epidural injection, physical therapy, and medications without significant relief.  A recent trial of a spinal cord stimulator provided about 95% relief for her pain syndrome.   On 10/23/17, she underwent a T9 laminotomy for placement of St. Trung epidural spinal cord stimulator.  She was admitted for 4 days for observation of postoperative abdominal pain and urinary retention.  She was discharged in stable and satisfactory condition on 10/27.  She presented to the ED on  with incisional pain over the posterior left flank. She was given morphine and instructed to follow up with our clinic today.  She is currently taking antibiotics.      Today, she reports she is tender along the medial edge of the incision over the posterior left flank. Pain improved with Norco 7.5. She denies abdominal pain or pain in the mid back operative site.  She is only tender superficially along the inner edge of her incision.  She denies wound drainage, fever, chills, B/B difficulties.     The patient's past medical history, past surgical history, family history, and social history have been reviewed at length in the electronic medical record.      Past Medical History:   Diagnosis Date   • Alcohol dependence     continuous drinking   • Arthritis    • Asthma    • Back pain    • Depression    • Diabetes insipidus    • Disease of thyroid gland    • Encephalitis    • Facet syndrome, lumbar    • Generalized seizure 2017   • GERD (gastroesophageal reflux disease)    •  "Glaucoma     bilat eye   • Headache    • Hiatal hernia    • Hypertension    • Kidney stone     present but small- no discomfort at this time    • Lumbar canal stenosis    • Meniscus tear     right knee   • Migraine    • Pituitary cyst    • RA (rheumatoid arthritis)    • Seizure     only occured from mylogram-    • Tooth wear     tooth cap in place- upper front    • Wears glasses      Past Surgical History:   Procedure Laterality Date   • APPENDECTOMY     • BRAIN SURGERY  2007    pituitary tumor   • CARPAL TUNNEL RELEASE      left   • CARPAL TUNNEL RELEASE WITH CUBITAL TUNNEL RELEASE Right    • CHOLECYSTECTOMY     • COLONOSCOPY     • ELBOW PROCEDURE  1996    \"fish cutters elbow\" repair- left   • ENDOSCOPY     • HYSTERECTOMY      first partial , then later removed ovaries when tumor showed up    • KNEE CARTILAGE SURGERY  2005    right    • OOPHORECTOMY      bilat ovaries when found tumor so went back after partial hysterectomy    • SPINAL CORD STIMULATOR IMPLANT N/A 10/23/2017    Procedure: SPINAL CORD STIMULATOR INSERTION PHASE 1 [facility] Trung;  Surgeon: Ever Samaniego MD;  Location: [facility];  Service:    • TONSILLECTOMY      unsure adnoids - age 7    • TRIGGER FINGER RELEASE Left 02/17/2017   • TUBAL ABDOMINAL LIGATION     • TUMOR REMOVAL  1990    fatty tumor on right shoulder   • WISDOM TOOTH EXTRACTION      x3 then later removed 4th      Social History     Social History   • Marital status:      Spouse name: N/A   • Number of children: N/A   • Years of education: N/A     Occupational History   • Not on file.     Social History Main Topics   • Smoking status: Former Smoker     Packs/day: 1.00     Years: 25.00     Types: Cigarettes     Quit date: 2/15/1987   • Smokeless tobacco: Never Used      Comment: did get up to 3 ppd but didnt do it long    • Alcohol use Yes      Comment: socially- but tries to drink non alcoholic since when does drink just keeps going- doesnt feel effects of alcohol so keeps drinking  "   • Drug use: No   • Sexual activity: Defer     Other Topics Concern   • Not on file     Social History Narrative     Ms. Villarreal has a current medication list which includes the following prescription(s): acetaminophen, budesonide-formoterol, buspirone, citalopram, desmopressin, dexlansoprazole, epinephrine, gabapentin, hydrocodone-acetaminophen, levothyroxine, methadone, mirtazapine, mometasone furo-formoterol fum, mupirocin, nitrofurantoin (macrocrystal-monohydrate), pravastatin, ranitidine, sennosides-docusate sodium, sumatriptan, tizanidine, and topiramate. See EMR for details.  She is allergic to aspirin; other; bactrim [sulfamethoxazole-trimethoprim]; barley grass; codeine; eggs or egg-derived products; percocet [oxycodone-acetaminophen]; trichophyton; and latex.      Review of Systems   Constitutional: Positive for fatigue. Negative for activity change, appetite change, chills, diaphoresis, fever and unexpected weight change.   HENT: Negative for congestion, dental problem, drooling, ear discharge, ear pain, facial swelling, hearing loss, mouth sores, nosebleeds, postnasal drip, rhinorrhea, sinus pressure, sneezing, sore throat, tinnitus, trouble swallowing and voice change.    Eyes: Negative for photophobia, pain, discharge, redness, itching and visual disturbance.   Respiratory: Positive for cough, wheezing and stridor. Negative for apnea, choking, chest tightness and shortness of breath.    Cardiovascular: Negative for chest pain, palpitations and leg swelling.   Gastrointestinal: Positive for constipation. Negative for abdominal distention, abdominal pain, anal bleeding, blood in stool, diarrhea, nausea, rectal pain and vomiting.   Endocrine: Negative for cold intolerance, heat intolerance, polydipsia, polyphagia and polyuria.   Genitourinary: Negative for decreased urine volume, difficulty urinating, dysuria, enuresis, flank pain, frequency, genital sores, hematuria and urgency.   Musculoskeletal:  "Positive for back pain. Negative for arthralgias, gait problem, joint swelling, myalgias, neck pain and neck stiffness.   Skin: Negative for color change, pallor, rash and wound.   Allergic/Immunologic: Negative for environmental allergies, food allergies and immunocompromised state.   Neurological: Negative for dizziness, tremors, seizures, syncope, facial asymmetry, speech difficulty, weakness, light-headedness, numbness and headaches.   Hematological: Negative for adenopathy. Does not bruise/bleed easily.   Psychiatric/Behavioral: Positive for dysphoric mood. Negative for agitation, behavioral problems, confusion, decreased concentration, hallucinations, self-injury, sleep disturbance and suicidal ideas. The patient is not nervous/anxious and is not hyperactive.    All other systems reviewed and are negative.       OBJECTIVE  Physical Exam     Vitals: /86 (BP Location: Right arm, Patient Position: Sitting, Cuff Size: Adult)  Temp 97.8 °F (36.6 °C) (Temporal Artery )   Ht 66.5\" (168.9 cm)  Wt 220 lb (99.8 kg)  BMI 34.98 kg/m2   General Appearance:   WDWN, alert, and agitated. NAD.   HEENT: NCAT   Chest Breathing unlabored.   Mood: Appropriate mood and affect.   Higher Integrative Fx: AAOx3. Speech intact.     Station and Gait: Station and gait are normal.   Skin: Left flank incision tender to palpation along medial edge. Pea-sized area of swelling at edge where superficial knot is under skin.  No visible drainage, induration, or warmth.  Incision edges are well approximated.  Midline thoracic skin incision is well approximated at appropriate stage of healing without warmth, drainage, or erythema.        Medical Decision Making  Data Review:    Reviewed ED records.  ABBEY reviewed at today's visit and is appropriate.    ASSESSMENT  Lumbar facet arthropathy/lumbar spondylosis without myelopathy s/p SCS placement    DISCUSSION  Decreased Norco 7.5 to 5 mg #45 BID PRN 0RF.  She may resume her " gabapentin.  Her incisions are well approximated with no S/S of infection.  There is a very small bump/swelling on the medial edge of the incision where the suture knot is located.  Instructed her clean area with mild soap, pat dry, apply warm compress 2-3 times daily to area, then apply mupirocin ointment.  I will see her back in 2 weeks for wound check.  Reviewed s/s of infection that would warrant her to RTC sooner.    Reviewed activity recommendations, home exercises, and wound care with patient today. She should continue to advance activity as tolerated.  Continue supportive measures as needed.    Reviewed clinical findings with Dr. Samaniego.   Return for f/up with me in 2 weeks. She should call or RTC sooner if symptoms worsen or new symptoms develop.     New Medications Ordered This Visit   Medications   • mupirocin (BACTROBAN) 2 % ointment     Sig: Apply warm compress to area, then Apply ointment to AA BID - TID.     Dispense:  15 g     Refill:  0       CANDI Liao MD  [facility]

## 2017-11-08 RX ORDER — HYDROCODONE BITARTRATE AND ACETAMINOPHEN 5; 325 MG/1; MG/1
1 TABLET ORAL 2 TIMES DAILY PRN
COMMUNITY
End: 2017-11-28 | Stop reason: SDDI

## 2017-11-11 NOTE — PROGRESS NOTES
"Chief Complaint: \"I am doing better.\"    Brief History: Mrs. Una Villarreal is a 67 y.o. female, who underwent implantation of a spinal cord stimulator device with Dr. Ever Samaniego on 10/23/2017 with [employer] Penta paddle lead with the top electrodes projecting at the level of the superior endplate of the T7 vertebral level. IPG: [employer]ung Proclaim 7 IPG Non-Rechargeable (Full Body MRI compatible). The device was implanted primarily for treatment of severe lower back pain and neurogenic claudication.   Patient has remained afebrile and surgical wounds are well healed and without erythema, drainage, or fluid accumulation. She was admitted for 4 days for observation of postoperative abdominal pain and urinary retention.  She was discharged in stable and satisfactory condition on 10/27/2017. She presented to the ED on 11/05/2017, with incisional pain over the posterior left flank and was placed on ABX.  Patient returns to the clinic for evaluation of lower back and abdominal pain and possible spinal cord stimulator reprogramming.  Ms. Una Villarreal reports almost 100% relief along with remarkable functional improvement with the use of her stimulator device. Patient reports significant relief of her previously severe neurogenic claudication.   Pain level is rated as 0/10 with the use of her spinal cord stimulator device.   Patient level ranges from 0/10 to 10/10 (abdominal pain) with the use of the spinal cord stimulator device.    Patient denies pain, numbness or weakness in the lower extremities  Patient denies any new bladder or bowel problems.   Patient denies difficulties with her balance or recent falls.    Review of previous therapies and additional medical records:  Una Villarreal has already failed the following measures, including:   Conservative measures: oral analgesics, opioids, topical analgesics, massage, physical therapy, ice, heat, chiropractic therapy and TENs   Interventional: Patient " underwent a successful SCS trial with me the week of 09/25/2017.  Surgical: No previous lumbar surgery  Una Villarreal underwent neurosurgical consultation with Dr. Ever Samaniego and was found not to be a surgical candidate.  I have reviewed Justice Report #[id number] consistent to medication reconciliation.  In terms of current analgesics, nUa Villarreal takes: Lortab, tizanidine, gabapentin, Tylenol, and compounded cream without side effects    Review of New Diagnostic Studies:   CT Thoracic Spine Without Intravenous Contrast 11/05/2017. Coarse bony trabecular pattern suggestive of diffuse osteopenia. Normal curvature of the spine, alignment of the vertebral bodies is normal.  Vertebral body height is normal without compression fracture or deformity. No evidence of a displaced fracture involving the vertebral bodies or their posterior elements.   T8-T9 laminectomy, dorsal epidural stimulator electrodes with the tip at the level of T6-T7 resulting extensive streak artifact.  Mild soft tissue infiltration and trace fluid collection in the midline paraspinal soft tissues at the surgical site. Mild to moderate degenerative changes in the visualized spine. Pre-and paravertebral soft tissues are grossly normal. Visualized aorta is unremarkable. Confluent and focal areas of groundglass opacities in the lungs bilaterally.  IMPRESSION: Unremarkable postoperative and degenerative changes without acute bony abnormality.     Diagnostic Studies:   MRI LUMBAR SPINE WO CONTRAST- 09/20/2016, revealed small broad-based disc protrusions at L3-L4 and at L4-L5.  Increased fluid signal at the lumbar facet joints laterally.  Lumbar facet joint hypertrophy.  This causes some lateral recess stenosis. There is no high-grade spinal stenosis. The conus medullaris is normal. The vertebral bodies are normal. There is no disc space narrowing.  CT HEAD WO CONTRAST- 02/21/2017. Marked amount of myelographic contrast in the basal cisterns and  subarachnoid spaces of the brain. Marked contrast is identified along the subarachnoid basal cisterns, prepontine cistern, interpeduncular cistern, and along the sylvian fissures. Myelographic contrast is noted over the falx tentorium and over the convexities of the brain as well as in the interhemispheric fissures. This is a marked amount of myelographic contrast that has migrated into the subarachnoid spaces of the brain. There is no evidence of intraaxial cerebral mass, hemorrhage or extracerebral collection other than the myelographic contrast. There is no midline shift or mass effect. The fourth ventricle appears to be patent and midline. Ocular lens and conal contents are normal. There is no impingement at the foramen magnum. Ventricular system is normal. The extended window datasets demonstrate changes of a right temporal craniotomy and the patient does have some encephalomalacia in the anterior limits of the right middle cranial fossa. Please correlate with surgical history. The mastoids and paranasal sinuses are clear with the exception of opacification in the right sphenoid sinus which could relate to the patient's previous surgery.  CT LUMBAR SPINE WITHOUT CONTRAST-02/21/2017:  Normal anatomic alignment of the lumbar spine is preserved. The conus medullaris terminates posterior to the T12-L1 level. There is no intrathecal mass. Normal anatomic alignment of the lumbar spine is preserved. The facet joints are normal.  AXIAL IMAGES:  L1-L2: The disc space is normal. There is no neuroforaminal narrowing or central canal stenosis.  L2-L3: The disc space is normal. There is no significant neuroforaminal narrowing or central canal stenosis.  L3-L4: A minimal disc bulge lateralizes towards the left. The facet joints are normal. There is mild left neuroforaminal narrowing. There is no significant central canal stenosis.    L4-L5: A mild symmetric disc bulge is present. The facet joints are normal. There is no  "significant neuroforaminal narrowing or central canal stenosis.  L5-S1: The disc space is normal. There is no significant neuroforaminal narrowing or central canal stenosis. The facet joints are grossly unremarkable.  IR MYELOGRAM LUMBAR SPINE-, XR SPINE LUMBAR FLEX AND EXT- L2-L3, L3-L4, and L4-5 levels there is mild anterior impression on thecal sac  At the L4-L5 level there is effacement of the nerve root sleeves bilaterally  X-ray Lumbar Spine with flexion and extension on 04/27/2015 revealed minimal degenerative change and no evidence of fracture or malalignment.   MRI of the lumbar spine on 12/03/2010 revealed lumbar facet hypertrophy at L3-L4, L4-L5, and L5-S1. There is no significant central canal stenosis. Ligamentum flavum hypertrophy measures 5 mm at L3-L4 and L4-L5. Degenerative disc disease at L3-L4, and L4-L5.    The following portions of the patient's history were reviewed and updated as appropriate: problem list, past medical history, past surgery history, social history, family history, medications, and allergies    Review of Systems    /94 (BP Location: Left arm, Patient Position: Sitting)  Pulse 80  Temp 97.5 °F (36.4 °C) (Temporal Artery )   Resp 18  Ht 66.5\" (168.9 cm)  Wt 203 lb (92.1 kg)  SpO2 98%  BMI 32.27 kg/m2      Physical Exam   Neurologic Exam  Constitutional: General appearance: No acute distress, well appearing and well nourished. Appears healthy, overweight, well hydrated and appearance reflects stated age.   Head and face: Normal. Palpation of the face and sinuses: No sinus tenderness.   Eyes Conjunctiva and lids: No swelling, erythema or discharge. Pupils: Equal, round, reactive to light.   Neck: Supple, symmetric, trachea midline, no masses.   Pulmonary Respiratory effort: No increased work of breathing or signs of respiratory distress. Auscultation of lungs: Clear to auscultation.   Cardiovascular Auscultation of heart: Normal rate and rhythm, normal S1 and S2, no " murmurs. Peripheral vascular exam: Normal. Examination of extremities for edema and/or varicosities: Normal.   Abdomen: Non-tender, no masses. The abdomen was obese. Bowel sounds were normal. The abdomen was soft and nontender. No masses palpated.   Musculoskeletal Gait and station: Normal. Gait evaluation demonstrated antalgia bilaterally. The range of motion of the lumbar spine is essentially full and without pain. Lumbar facet joint loading maneuvers are negative. Gary and Gaenslen's tests are negative.The range of motion of the hip joints is full and without pain. Palpation of the greater trochanters reproduces pain. Right knee ROM: Extension 0 degrees, flexion 110 degrees. Significant crepitus. Muscle strength/tone: Normal. Trigger points at the left multifidus, left psoas, left gluteus medius, and left QL.  Neurologic   Cranial nerves: Cranial nerves II-XII intact.   Cortical function: Normal mental status.   Reflexes: 2+ and symmetric. Deep tendon reflexes: 2+ right biceps, 2+ left biceps, 2+ right patella, 2+ left patella, 2+ right ankle jerk and 2+ left ankle jerk. Superficial/Primitive Reflexes: primitive reflexes were absent. Straight leg raising test is negative. Femoral stretch sign is negative.   Sensation: No sensory loss. Sensory exam: intact to light touch, intact pain and temperature sensation, intact vibration sensation and normal proprioception.   Skin and subcutaneous tissue: Normal without rashes or lesions. Surgical wounds are well healed without erythema, drainage, or fluid accumulation.  There is complete epithelization.  Coordination: Normal finger to nose and heel to shin. Coordination: normal balance and negative Romberg's sign.   Psychiatric: Judgment and insight: Normal. Orientation to person, place, and time: Normal. Recent and remote memory: Intact. Mood and affect: Normal.      PROCEDURE: Analysis of the spinal cord stimulator device with complex spinal cord stimulator  reprogramming   Patient used the [employer] spinal cord stimulator device for 504 hours since last reprogramming, and 504 lifetime hours (average 24 hours per day). Analysis of impedence reveals normal impedence for all contacts.The spinal cord stimulator device was reprogrammed under my direct supervision and three programs were created by adjusting electrode polarities, amplitude, pulse width,  pulse rate, BurstDR, micro-dosing, in the following fashion;  Program TWO (Best Program):    Electrode polarities: 7+, 8-, 9-, 10+, 11+, 12-  Amplitude: 1.8-4 mA     Pulse width: 460 mcs  Rate: 30 Hz    Patient experienced significant pain relief with coverage with pleasant paresthesia in all areas of chronic pain.  Walking ability test performed revealing resolution of previously severe neurogenic claudication.  Time spent reprogrammin minutes  A copy of the telemetry report will be scanned in the patient's chart.    ASSESSMENT:   1. Lumbar stenosis with neurogenic claudication    2. Stenosis of lateral recess of lumbar spine    3. Lumbar facet arthropathy/lumbar spondylosis without myelopathy    4. DDD (degenerative disc disease), lumbar    5. Greater trochanteric bursitis of both hips    6. Myofascial pain    7. Moderate obesity    8. Restless legs syndrome (RLS)    9. History of alcohol dependence    10. Other depression    11. Physical deconditioning      PLAN: Patient's chronic pain condition is significantly improved with her SCS device. Continue current management and any additional workup, referrals, and treatments as outlined in the following plan: Therefore, have proposed the following plan:  1. Follow-up in about weeks  2. Long-term rehabilitation efforts:  A. Patient will start a comprehensive physical therapy program for reconditioning, water therapy, therapeutic exercise, core strengthening, gait and balance training, neurodynamics, desensitization techniques, ultrasound, ASTYM, dry needling, and  myofascial release after her next visit  B. Continue efforts at weight loss  C. Contrast therapy  D. Patient has been instructed regarding universal fall precautions, such as;  · Using a cane or a rolling walker at all times for ambulation  · Removing all area rugs and coffee tables to create a safe environment at home  · Using a shower transfer bench  · Using walk-in shower and having shower safety bars installed  3.  Pharmacological measures:  a.  Continue lidocaine 10%, prilocaine 2%, baclofen 5%, cyclobenzaprine 4%, and ketoprofen 10% cream, apply 1 to 2 grams of cream to the affected areas every 4 to 6 hours as needed.  b.  Continue tizanidine 2 mg ½ to 1 tablet three times a day as needed for muscle spasms.   c.  Take Tylenol 500 mg four times daily as needed for mild to moderate breakthrough pain  d.  Continue Requip 0.25 mg 1-2 tablets at bedtime  e.  Continue gabapentin 100 mg 4 times a day  4. The patient has been instructed to contact my office with any questions or difficulties. The patient understands the plan and agrees to proceed accordingly.       Patient Care Team:  Zachary Reddy DO as PCP - General  Rashid Cardona MD as PCP - Claims Attributed  Cheikh Alves MD as Consulting Physician (Anesthesiology)  Ever Samaniego MD as Consulting Physician (Neurosurgery)  Dee Mccabe PA-C as Physician Assistant (Physician Assistant)     No orders of the defined types were placed in this encounter.        Future Appointments  Date Time Provider Department [place]   11/28/2017 10:00 AM Charlee Gray PA-C [facility] None         Cheikh Alves MD    EMR Dragon/Transcription disclaimer:  Much of this encounter note is an electronic transcription of spoken language to printed text. Electronic transcription of spoken language may permit erroneous, or at times, nonsensical words or phrases to be inadvertently transcribed. Although I have reviewed the note for such errors, some may still  exist.

## 2017-11-13 ENCOUNTER — OFFICE VISIT (OUTPATIENT)
Dept: PAIN MEDICINE | Facility: CLINIC | Age: 67
End: 2017-11-13

## 2017-11-13 VITALS
TEMPERATURE: 97.5 F | HEIGHT: 67 IN | DIASTOLIC BLOOD PRESSURE: 94 MMHG | OXYGEN SATURATION: 98 % | WEIGHT: 203 LBS | RESPIRATION RATE: 18 BRPM | HEART RATE: 80 BPM | BODY MASS INDEX: 31.86 KG/M2 | SYSTOLIC BLOOD PRESSURE: 146 MMHG

## 2017-11-13 DIAGNOSIS — M79.18 MYOFASCIAL PAIN: ICD-10-CM

## 2017-11-13 DIAGNOSIS — M51.36 DDD (DEGENERATIVE DISC DISEASE), LUMBAR: ICD-10-CM

## 2017-11-13 DIAGNOSIS — G25.81 RESTLESS LEGS SYNDROME (RLS): ICD-10-CM

## 2017-11-13 DIAGNOSIS — R53.81 PHYSICAL DECONDITIONING: ICD-10-CM

## 2017-11-13 DIAGNOSIS — M70.61 GREATER TROCHANTERIC BURSITIS OF BOTH HIPS: ICD-10-CM

## 2017-11-13 DIAGNOSIS — F32.89 OTHER DEPRESSION: ICD-10-CM

## 2017-11-13 DIAGNOSIS — M70.62 GREATER TROCHANTERIC BURSITIS OF BOTH HIPS: ICD-10-CM

## 2017-11-13 DIAGNOSIS — F10.21 HISTORY OF ALCOHOL DEPENDENCE (HCC): ICD-10-CM

## 2017-11-13 DIAGNOSIS — M48.061 STENOSIS OF LATERAL RECESS OF LUMBAR SPINE: ICD-10-CM

## 2017-11-13 DIAGNOSIS — M47.816 LUMBAR FACET ARTHROPATHY: ICD-10-CM

## 2017-11-13 DIAGNOSIS — M48.062 LUMBAR STENOSIS WITH NEUROGENIC CLAUDICATION: ICD-10-CM

## 2017-11-13 DIAGNOSIS — E66.8 MODERATE OBESITY: ICD-10-CM

## 2017-11-13 PROCEDURE — 99213 OFFICE O/P EST LOW 20 MIN: CPT | Performed by: ANESTHESIOLOGY

## 2017-11-13 PROCEDURE — 95972 ALYS CPLX SP/PN NPGT W/PRGRM: CPT | Performed by: ANESTHESIOLOGY

## 2017-11-20 RX ORDER — HYDROCODONE BITARTRATE AND ACETAMINOPHEN 5; 325 MG/1; MG/1
1 TABLET ORAL 2 TIMES DAILY PRN
OUTPATIENT
Start: 2017-11-20

## 2017-11-20 NOTE — TELEPHONE ENCOUNTER
Patient's only complaint is stomach related.  She has decreased appetitive, and some really loose stools. She will make an apt with Dr. Reddy and follow-up with us if needed.

## 2017-11-20 NOTE — TELEPHONE ENCOUNTER
Provider:  Aden  Caller: Una Villarreal  Time of call:   09:19 AM  Phone #:  [phone]  Surgery:  SCS placement  Surgery Date:  10/23/17  Last visit:   11/07/17  Next visit: 11/28/17    ABBEY:   10/05/2017 Hydrocodone/Acetaminophen  325MG/5MG 1950 12 3 LisaCoryZuhair      10/28/2017 Hydrocodone/Acetaminophen  325MG/10MG 1950 30 10 Ever Samaniego  11/05/2017 Hydrocodone/Acetaminophen  325MG/10MG 1950 10 4 Taylor Rosario  11/08/2017 Hydrocodone/Acetaminophen  325MG/5MG 1950 45 23 Ever Samaniego    Reason for call:       Pt requesting refill of [place], said she is going out of town on Wednesday.     She did not verify address    ------    Pt was given a 23 day supply of Norco 5 on 11/08 by Charlee, which should last her until 11/30.     Called pt to find out why she was needing a refill so early, went straight to [facility]. Left message asking patient to call us back and let us know why she is needing a refill 10 days early.

## 2017-11-20 NOTE — TELEPHONE ENCOUNTER
If patient continues to be in pain, then she needs to be seen before she goes out of town.  Otherwise, she needs to follow up with her pain doctor or the doctor that previously managed her pain medications.

## 2017-11-20 NOTE — TELEPHONE ENCOUNTER
"Patient called back in and spoke to Reva who relayed the information to me. Said that \"she was previously taking the Norco every 6 hours and she continued to take it that way, said that she did not read the bottle and did not see that she was only supposed to take it only twice a day\".     (the script she got from Dr. Rosario on 11/5 was written for TID PRN. Script she got from Dr. Samaniego on 10/23 was also written for TID PRN, so I'm not sure where she got the Q6H from...)       "

## 2017-11-20 NOTE — TELEPHONE ENCOUNTER
If she continues to be in pain after SCS placement last month, then she needs to be seen.  Otherwise, she needs to follow up with her pain doctor

## 2017-11-28 ENCOUNTER — OFFICE VISIT (OUTPATIENT)
Dept: NEUROSURGERY | Facility: CLINIC | Age: 67
End: 2017-11-28

## 2017-11-28 VITALS
BODY MASS INDEX: 30.92 KG/M2 | SYSTOLIC BLOOD PRESSURE: 130 MMHG | HEART RATE: 75 BPM | WEIGHT: 197 LBS | DIASTOLIC BLOOD PRESSURE: 100 MMHG | HEIGHT: 67 IN | TEMPERATURE: 97.6 F

## 2017-11-28 DIAGNOSIS — M47.816 LUMBAR FACET ARTHROPATHY: Primary | ICD-10-CM

## 2017-11-28 PROCEDURE — 99024 POSTOP FOLLOW-UP VISIT: CPT | Performed by: PHYSICIAN ASSISTANT

## 2017-11-28 NOTE — PROGRESS NOTES
[facility]    Patient: Una Villarreal      Date: 17  : 1950   MRN: [id number]    PCP: Zachary Reddy, DO  ______________________________________________________________________      CHIEF COMPLIANT:   Low back and bilateral leg pain     HISTORY OF PRESENT ILLNESS  Ms. Villarreal is a 67 y.o. who presented with chronic back and bilateral lower extremity pain. She has undergone multiple rhizotomies, an epidural injection, physical therapy, and medications without significant relief.  A recent trial of a spinal cord stimulator provided about 95% relief for her pain syndrome.   On 10/23/17, she underwent a T9 laminotomy for placement of St. Trung epidural spinal cord stimulator.  Postoperatively, she had abdominal pain, urinary retention, and incisional pain, which have all improved.  She is doing quite well today.  No wound drainage, redness, swelling.  She continues to be followed by pain management.      The patient's past medical history, past surgical history, family history, and social history have been reviewed at length in the electronic medical record.      Past Medical History:   Diagnosis Date   • Alcohol dependence     continuous drinking   • Arthritis    • Asthma    • Back pain    • Depression    • Diabetes insipidus    • Disease of thyroid gland    • Encephalitis    • Facet syndrome, lumbar    • Generalized seizure 2017   • GERD (gastroesophageal reflux disease)    • Glaucoma     bilat eye   • Headache    • Hiatal hernia    • Hypertension    • Kidney stone     present but small- no discomfort at this time    • Lumbar canal stenosis    • Meniscus tear     right knee   • Migraine    • Pituitary cyst    • RA (rheumatoid arthritis)    • Seizure     only occured from mylogram-    • Tooth wear     tooth cap in place- upper front    • Wears glasses      Past Surgical History:   Procedure Laterality Date   • APPENDECTOMY     • BRAIN  "SURGERY  2007    pituitary tumor   • CARPAL TUNNEL RELEASE      left   • CARPAL TUNNEL RELEASE WITH CUBITAL TUNNEL RELEASE Right    • CHOLECYSTECTOMY     • COLONOSCOPY     • ELBOW PROCEDURE  1996    \"fish cutters elbow\" repair- left   • ENDOSCOPY     • HYSTERECTOMY      first partial , then later removed ovaries when tumor showed up    • KNEE CARTILAGE SURGERY  2005    right    • OOPHORECTOMY      bilat ovaries when found tumor so went back after partial hysterectomy    • SPINAL CORD STIMULATOR IMPLANT N/A 10/23/2017    Procedure: SPINAL CORD STIMULATOR INSERTION PHASE 1 [employer]. [employer]ung;  Surgeon: Ever Samaniego MD;  Location: [facility];  Service:    • TONSILLECTOMY      unsure adnoids - age 7    • TRIGGER FINGER RELEASE Left 02/17/2017   • TUBAL ABDOMINAL LIGATION     • TUMOR REMOVAL  1990    fatty tumor on right shoulder   • WISDOM TOOTH EXTRACTION      x3 then later removed 4th      Social History     Social History   • Marital status:      Spouse name: N/A   • Number of children: N/A   • Years of education: N/A     Occupational History   • Not on file.     Social History Main Topics   • Smoking status: Former Smoker     Packs/day: 1.00     Years: 25.00     Types: Cigarettes     Quit date: 2/15/1987   • Smokeless tobacco: Never Used      Comment: did get up to 3 ppd but didnt do it long    • Alcohol use Yes      Comment: socially- but tries to drink non alcoholic since when does drink just keeps going- doesnt feel effects of alcohol so keeps drinking    • Drug use: No   • Sexual activity: Defer     Other Topics Concern   • Not on file     Social History Narrative     Ms. Villarreal has a current medication list which includes the following prescription(s): acetaminophen, budesonide-formoterol, buspirone, citalopram, desmopressin, dexlansoprazole, epinephrine, gabapentin, levothyroxine, mirtazapine, mometasone furo-formoterol fum, nitrofurantoin (macrocrystal-monohydrate), pravastatin, ranitidine, " sennosides-docusate sodium, sumatriptan, tizanidine, and topiramate. See EMR for details.  She is allergic to aspirin; other; bactrim [sulfamethoxazole-trimethoprim]; barley grass; codeine; eggs or egg-derived products; percocet [oxycodone-acetaminophen]; trichophyton; and latex.      Review of Systems   Constitutional: Negative for activity change, appetite change, chills, diaphoresis, fatigue, fever and unexpected weight change.   HENT: Negative for congestion, dental problem, drooling, ear discharge, ear pain, facial swelling, hearing loss, mouth sores, nosebleeds, postnasal drip, rhinorrhea, sinus pressure, sneezing, sore throat, tinnitus, trouble swallowing and voice change.    Eyes: Negative for photophobia, pain, discharge, redness, itching and visual disturbance.   Respiratory: Negative for apnea, cough, choking, chest tightness, shortness of breath, wheezing and stridor.    Cardiovascular: Negative for chest pain, palpitations and leg swelling.   Gastrointestinal: Positive for abdominal pain, constipation and diarrhea. Negative for abdominal distention, anal bleeding, blood in stool, nausea, rectal pain and vomiting.   Musculoskeletal: Positive for back pain. Negative for arthralgias, gait problem, joint swelling, myalgias, neck pain and neck stiffness.   Skin: Negative for color change, pallor, rash and wound.   Allergic/Immunologic: Negative for environmental allergies, food allergies and immunocompromised state.   Neurological: Negative for dizziness, tremors, seizures, syncope, facial asymmetry, speech difficulty, weakness, light-headedness, numbness and headaches.   Hematological: Negative for adenopathy. Does not bruise/bleed easily.   Psychiatric/Behavioral: Negative for agitation, behavioral problems, confusion, decreased concentration, dysphoric mood, self-injury, sleep disturbance and suicidal ideas. The patient is not nervous/anxious and is not hyperactive.         OBJECTIVE  Physical  "Exam  Vitals: Blood pressure 130/100, pulse 75, temperature 97.6 °F (36.4 °C), height 66.5\" (168.9 cm), weight 197 lb (89.4 kg).  Body mass index is 31.32 kg/(m^2).   General Appearance:   WDWN, alert, and cooperative. NAD.   HEENT: NCAT   Chest Breathing unlabored.   Mood: Appropriate mood and affect.   Higher Integrative Fx: AAOx3. Speech intact.     Station and Gait: Station and gait are normal.   Skin: Skin incisions are both well approximated at appropriate stage of healing without warmth, drainage, or erythema.        Medical Decision Making      ASSESSMENT  Lumbar facet arthropathy/lumbar spondylosis without myelopathy s/p SCS placement    DISCUSSION  Ms. Villarreal's incision is healing well with no s/s of infection. She should continue to advance activity as tolerated.   Return for f/up PRN. She should call or RTC sooner if symptoms worsen or new symptoms develop.     Time:   15 minutes with more than 50% of time spent counseling and coordination of care with patient.     CANDI Liao MD  [facility]  11/28/17            "

## 2017-12-11 ENCOUNTER — TELEPHONE (OUTPATIENT)
Dept: PAIN MEDICINE | Facility: CLINIC | Age: 67
End: 2017-12-11

## 2017-12-12 ENCOUNTER — OFFICE VISIT (OUTPATIENT)
Dept: PAIN MEDICINE | Facility: CLINIC | Age: 67
End: 2017-12-12

## 2017-12-12 VITALS
HEIGHT: 55 IN | DIASTOLIC BLOOD PRESSURE: 91 MMHG | BODY MASS INDEX: 48.37 KG/M2 | RESPIRATION RATE: 20 BRPM | WEIGHT: 209 LBS | TEMPERATURE: 97.6 F | OXYGEN SATURATION: 97 % | HEART RATE: 86 BPM | SYSTOLIC BLOOD PRESSURE: 122 MMHG

## 2017-12-12 DIAGNOSIS — M48.061 STENOSIS OF LATERAL RECESS OF LUMBAR SPINE: ICD-10-CM

## 2017-12-12 DIAGNOSIS — F10.21 HISTORY OF ALCOHOL DEPENDENCE (HCC): ICD-10-CM

## 2017-12-12 DIAGNOSIS — M51.36 DDD (DEGENERATIVE DISC DISEASE), LUMBAR: ICD-10-CM

## 2017-12-12 DIAGNOSIS — R53.81 PHYSICAL DECONDITIONING: ICD-10-CM

## 2017-12-12 DIAGNOSIS — E66.8 MODERATE OBESITY: ICD-10-CM

## 2017-12-12 DIAGNOSIS — F32.89 OTHER DEPRESSION: ICD-10-CM

## 2017-12-12 DIAGNOSIS — M48.062 LUMBAR STENOSIS WITH NEUROGENIC CLAUDICATION: ICD-10-CM

## 2017-12-12 DIAGNOSIS — G25.81 RESTLESS LEGS SYNDROME (RLS): ICD-10-CM

## 2017-12-12 DIAGNOSIS — M70.61 GREATER TROCHANTERIC BURSITIS OF BOTH HIPS: ICD-10-CM

## 2017-12-12 DIAGNOSIS — M70.62 GREATER TROCHANTERIC BURSITIS OF BOTH HIPS: ICD-10-CM

## 2017-12-12 DIAGNOSIS — M47.816 LUMBAR FACET ARTHROPATHY: ICD-10-CM

## 2017-12-12 DIAGNOSIS — M79.18 MYOFASCIAL PAIN: ICD-10-CM

## 2017-12-12 PROCEDURE — 99213 OFFICE O/P EST LOW 20 MIN: CPT | Performed by: ANESTHESIOLOGY

## 2017-12-12 PROCEDURE — 95972 ALYS CPLX SP/PN NPGT W/PRGRM: CPT | Performed by: ANESTHESIOLOGY

## 2017-12-12 NOTE — PROGRESS NOTES
"Chief Complaint: \"I am doing really well with the stimulator. I want to get it adjusted to cover my upper back.\"    Brief History: Mrs. Una Villarreal is a 67 y.o. female, who underwent implantation of a spinal cord stimulator device with Dr. Ever Samaniego on 10/23/2017 with [employer] Penta paddle lead with the top electrodes projecting at the level of the superior endplate of the T7 vertebral level. IPG: [employer] Proclaim 7 IPG Non-Rechargeable (Full Body MRI compatible). The device was implanted primarily for treatment of severe lower back pain and neurogenic claudication.   Patient was last seen on 11/13/2017, for follow-up evaluation and SCS reprogramming. Patient has remained afebrile and surgical wounds are well healed with complete epithelization and without erythema, drainage, or fluid accumulation. Patient returns to the clinic for evaluation of upper back pain and possible spinal cord stimulator reprogramming.  Ms. Una Villarreal reports almost 100% relief along with remarkable functional improvement with the use of her stimulator device. Patient reports significant relief of her previously severe neurogenic claudication. Patient does not take any opioids.  Pain level is rated as 0/10 with the use of her spinal cord stimulator device.   Patient level ranges from 0/10 to 6/10 (upper back pain) with the use of the spinal cord stimulator device.    Patient denies pain, numbness or weakness in the lower extremities  Patient denies any new bladder or bowel problems.   Patient denies difficulties with her balance or recent falls.  Patient reports that her depression has improved significantly as a result of excellent pain relief from her SCS device.    Review of previous therapies and additional medical records:  Una Villarreal has already failed the following measures, including:   Conservative measures: oral analgesics, opioids, topical analgesics, massage, physical therapy, ice, heat, chiropractic " therapy and TENs   Interventional: Patient underwent a successful SCS trial with me the week of 09/25/2017.  Surgical: No previous lumbar surgery  Una Villarreal underwent neurosurgical consultation with Dr. Ever Samaniego and was found not to be a surgical candidate.  I have reviewed Justice Report #[id number] consistent to medication reconciliation.  In terms of current analgesics, Una Villarreal takes: tizanidine, gabapentin, Tylenol, and compounded cream without side effects    Diagnostic Studies:   CT Thoracic Spine Without Intravenous Contrast 11/05/2017. Coarse bony trabecular pattern suggestive of diffuse osteopenia. Normal curvature of the spine, alignment of the vertebral bodies is normal.  Vertebral body height is normal without compression fracture or deformity. No evidence of a displaced fracture involving the vertebral bodies or their posterior elements.   T8-T9 laminectomy, dorsal epidural stimulator electrodes with the tip at the level of T6-T7 resulting extensive streak artifact.  Mild soft tissue infiltration and trace fluid collection in the midline paraspinal soft tissues at the surgical site. Mild to moderate degenerative changes in the visualized spine. Pre-and paravertebral soft tissues are grossly normal. Visualized aorta is unremarkable. Confluent and focal areas of groundglass opacities in the lungs bilaterally.  IMPRESSION: Unremarkable postoperative and degenerative changes without acute bony abnormality.  MRI LUMBAR SPINE WO CONTRAST- 09/20/2016, revealed small broad-based disc protrusions at L3-L4 and at L4-L5.  Increased fluid signal at the lumbar facet joints laterally.  Lumbar facet joint hypertrophy.  This causes some lateral recess stenosis. There is no high-grade spinal stenosis. The conus medullaris is normal. The vertebral bodies are normal. There is no disc space narrowing.  CT HEAD WO CONTRAST- 02/21/2017. Marked amount of myelographic contrast in the basal cisterns and  subarachnoid spaces of the brain. Marked contrast is identified along the subarachnoid basal cisterns, prepontine cistern, interpeduncular cistern, and along the sylvian fissures. Myelographic contrast is noted over the falx tentorium and over the convexities of the brain as well as in the interhemispheric fissures. This is a marked amount of myelographic contrast that has migrated into the subarachnoid spaces of the brain. There is no evidence of intraaxial cerebral mass, hemorrhage or extracerebral collection other than the myelographic contrast. There is no midline shift or mass effect. The fourth ventricle appears to be patent and midline. Ocular lens and conal contents are normal. There is no impingement at the foramen magnum. Ventricular system is normal. The extended window datasets demonstrate changes of a right temporal craniotomy and the patient does have some encephalomalacia in the anterior limits of the right middle cranial fossa. Please correlate with surgical history. The mastoids and paranasal sinuses are clear with the exception of opacification in the right sphenoid sinus which could relate to the patient's previous surgery.  CT LUMBAR SPINE WITHOUT CONTRAST-02/21/2017:  Normal anatomic alignment of the lumbar spine is preserved. The conus medullaris terminates posterior to the T12-L1 level. There is no intrathecal mass. Normal anatomic alignment of the lumbar spine is preserved. The facet joints are normal.  AXIAL IMAGES:  L1-L2: The disc space is normal. There is no neuroforaminal narrowing or central canal stenosis.  L2-L3: The disc space is normal. There is no significant neuroforaminal narrowing or central canal stenosis.  L3-L4: A minimal disc bulge lateralizes towards the left. The facet joints are normal. There is mild left neuroforaminal narrowing. There is no significant central canal stenosis.    L4-L5: A mild symmetric disc bulge is present. The facet joints are normal. There is no  "significant neuroforaminal narrowing or central canal stenosis.  L5-S1: The disc space is normal. There is no significant neuroforaminal narrowing or central canal stenosis. The facet joints are grossly unremarkable.  IR MYELOGRAM LUMBAR SPINE-, XR SPINE LUMBAR FLEX AND EXT- L2-L3, L3-L4, and L4-5 levels there is mild anterior impression on thecal sac  At the L4-L5 level there is effacement of the nerve root sleeves bilaterally  X-ray Lumbar Spine with flexion and extension on 04/27/2015 revealed minimal degenerative change and no evidence of fracture or malalignment.   MRI of the lumbar spine on 12/03/2010 revealed lumbar facet hypertrophy at L3-L4, L4-L5, and L5-S1. There is no significant central canal stenosis. Ligamentum flavum hypertrophy measures 5 mm at L3-L4 and L4-L5. Degenerative disc disease at L3-L4, and L4-L5.    The following portions of the patient's history were reviewed and updated as appropriate: problem list, past medical history, past surgery history, social history, family history, medications, and allergies    Review of Systems   All other systems reviewed and are negative.    /91 (BP Location: Right arm, Patient Position: Sitting)  Pulse 86  Temp 97.6 °F (36.4 °C) (Temporal Artery )   Resp 20  Ht 66.5 cm (26.18\")  Wt 94.8 kg (209 lb)  SpO2 97%  .38 kg/m2      Physical Exam   Neurologic Exam  Constitutional: General appearance: No acute distress, well appearing and well nourished. Appears healthy, overweight, well hydrated and appearance reflects stated age.   Head and face: Normal. Palpation of the face and sinuses: No sinus tenderness.   Eyes Conjunctiva and lids: No swelling, erythema or discharge. Pupils: Equal, round, reactive to light.   Neck: Supple, symmetric, trachea midline, no masses.   Pulmonary Respiratory effort: No increased work of breathing or signs of respiratory distress. Auscultation of lungs: Clear to auscultation.   Cardiovascular Auscultation of " heart: Normal rate and rhythm, normal S1 and S2, no murmurs. Peripheral vascular exam: Normal. Examination of extremities for edema and/or varicosities: Normal.   Abdomen: Non-tender, no masses. The abdomen was obese. Bowel sounds were normal. The abdomen was soft and nontender. No masses palpated.   Musculoskeletal Gait and station: Normal. Gait evaluation demonstrated antalgia bilaterally. The range of motion of the lumbar spine is essentially full and without pain. Lumbar facet joint loading maneuvers are negative. Gary and Gaenslen's tests are negative.The range of motion of the hip joints is full and without pain. Palpation of the greater trochanters reproduces pain. Right knee ROM: Extension 0 degrees, flexion 110 degrees. Significant crepitus. Muscle strength/tone: Normal. No trigger points.  Neurologic   Cranial nerves: Cranial nerves II-XII intact.   Cortical function: Normal mental status.   Reflexes: 2+ and symmetric. Deep tendon reflexes: 2+ right biceps, 2+ left biceps, 2+ right patella, 2+ left patella, 2+ right ankle jerk and 2+ left ankle jerk. Superficial/Primitive Reflexes: primitive reflexes were absent. Straight leg raising test is negative. Femoral stretch sign is negative.   Sensation: No sensory loss. Sensory exam: intact to light touch, intact pain and temperature sensation, intact vibration sensation and normal proprioception.   Skin and subcutaneous tissue: Normal without rashes or lesions. Surgical wounds are well healed without erythema, drainage, or fluid accumulation.  There is complete epithelization.  Coordination: Normal finger to nose and heel to shin. Coordination: normal balance and negative Romberg's sign.   Psychiatric: Judgment and insight: Normal. Orientation to person, place, and time: Normal. Recent and remote memory: Intact. Mood and affect: Normal.      PROCEDURE: Analysis of the spinal cord stimulator device with complex spinal cord stimulator reprogramming   Patient  used the [employer] spinal cord stimulator device for 672 hours since last reprogramming, and 1176 lifetime hours (average 24 hours per day). Analysis of impedence reveals normal impedence for all contacts.The spinal cord stimulator device was reprogrammed under my direct supervision and four programs were created by adjusting electrode polarities, amplitude, pulse width,  pulse rate, BurstDR, micro-dosing, in the following fashion;  Program FOUR (Best Program):    Electrode polarities: 1+, 7-, 15+  Amplitude: 3.6-7.4 mA     Pulse width: 500 mcs  Rate: 40 Hz  Patient experienced significant pain relief with coverage with pleasant paresthesia in all areas of chronic pain.  Walking ability test performed revealing resolution of previously severe neurogenic claudication.  Time spent reprogramming: 15 minutes  A copy of the telemetry report will be scanned in the patient's chart.    ASSESSMENT:   1. Lumbar stenosis with neurogenic claudication    2. Stenosis of lateral recess of lumbar spine    3. Lumbar facet arthropathy/lumbar spondylosis without myelopathy    4. DDD (degenerative disc disease), lumbar    5. Greater trochanteric bursitis of both hips    6. Myofascial pain    7. Moderate obesity    8. Restless legs syndrome (RLS)    9. History of alcohol dependence    10. Other depression    11. Physical deconditioning       PLAN: Patient's chronic pain condition is significantly improved with her SCS device. Continue current management and any additional workup, referrals, and treatments as outlined in the following plan: Therefore, have proposed the following plan:  1. Follow-up on an as needed basis  2. Long-term rehabilitation efforts:  A. Start a comprehensive physical therapy program for reconditioning, water therapy, therapeutic exercise, core strengthening, gait and balance training, neurodynamics, desensitization techniques, ultrasound, ASTYM, dry needling, and myofascial release   B. Continue efforts at  weight loss  C. Contrast therapy  D. Patient has been instructed regarding universal fall precautions, such as;  · Using a cane or a rolling walker at all times for ambulation  · Removing all area rugs and coffee tables to create a safe environment at home  · Using a shower transfer bench  · Using walk-in shower and having shower safety bars installed  3.  Pharmacological measures:  a.  Continue lidocaine 10%, prilocaine 2%, baclofen 5%, cyclobenzaprine 4%, and ketoprofen 10% cream, apply 1 to 2 grams of cream to the affected areas every 4 to 6 hours as needed.  b.  Continue tizanidine 2 mg ½ to 1 tablet three times a day as needed for muscle spasms.   c.  Take Tylenol 500 mg four times daily as needed for mild to moderate breakthrough pain  d.  Continue Requip 0.25 mg 1-2 tablets at bedtime  e.  Continue gabapentin 100 mg 4 times a day  4. The patient has been instructed to contact my office with any questions or difficulties. The patient understands the plan and agrees to proceed accordingly.       Patient Care Team:  Zachary Reddy DO as PCP - General  Rashid Cardona MD as PCP - Claims Attributed  Cheikh Alves MD as Consulting Physician (Anesthesiology)  Ever Samaniego MD as Consulting Physician (Neurosurgery)  Dee Mccabe PA-C as Physician Assistant (Physician Assistant)     No orders of the defined types were placed in this encounter.        No future appointments.      Cheikh Alves MD    EMR Dragon/Transcription disclaimer:  Much of this encounter note is an electronic transcription of spoken language to printed text. Electronic transcription of spoken language may permit erroneous, or at times, nonsensical words or phrases to be inadvertently transcribed. Although I have reviewed the note for such errors, some may still exist.

## 2018-05-25 ENCOUNTER — LAB REQUISITION (OUTPATIENT)
Dept: LAB | Facility: HOSPITAL | Age: 68
End: 2018-05-25

## 2018-05-25 DIAGNOSIS — K92.0 HEMATEMESIS: ICD-10-CM

## 2018-05-25 PROCEDURE — 88305 TISSUE EXAM BY PATHOLOGIST: CPT | Performed by: INTERNAL MEDICINE

## 2018-05-29 LAB
CYTO UR: NORMAL
LAB AP CASE REPORT: NORMAL
LAB AP CLINICAL INFORMATION: NORMAL
Lab: NORMAL
PATH REPORT.FINAL DX SPEC: NORMAL
PATH REPORT.GROSS SPEC: NORMAL

## 2018-06-01 ENCOUNTER — LAB REQUISITION (OUTPATIENT)
Dept: LAB | Facility: HOSPITAL | Age: 68
End: 2018-06-01

## 2018-06-01 DIAGNOSIS — Z00.00 ROUTINE GENERAL MEDICAL EXAMINATION AT A HEALTH CARE FACILITY: ICD-10-CM

## 2018-06-01 LAB
ANION GAP SERPL CALCULATED.3IONS-SCNC: 8 MMOL/L (ref 3–11)
BUN BLD-MCNC: 13 MG/DL (ref 9–23)
BUN/CREAT SERPL: 16.3 (ref 7–25)
CALCIUM SPEC-SCNC: 9.3 MG/DL (ref 8.7–10.4)
CHLORIDE SERPL-SCNC: 103 MMOL/L (ref 99–109)
CO2 SERPL-SCNC: 24 MMOL/L (ref 20–31)
CREAT BLD-MCNC: 0.8 MG/DL (ref 0.6–1.3)
GFR SERPL CREATININE-BSD FRML MDRD: 72 ML/MIN/1.73
GLUCOSE BLD-MCNC: 102 MG/DL (ref 70–100)
POTASSIUM BLD-SCNC: 4.8 MMOL/L (ref 3.5–5.5)
SODIUM BLD-SCNC: 135 MMOL/L (ref 132–146)

## 2018-06-01 PROCEDURE — 80048 BASIC METABOLIC PNL TOTAL CA: CPT

## 2018-12-14 NOTE — PROGRESS NOTES
"Chief Complaint: \"I need more coverage in my mid and lower back.\"    Brief History: Mrs. Una Villarreal is a 67 y.o. female, who underwent implantation of a spinal cord stimulator device with Dr. Ever Samaniego on 10/23/2017 with [employer] Penta paddle lead (Full Body MRI compatible).  The device was implanted primarily for treatment of severe lower back pain and neurogenic claudication.   Patient was last seen on 12/12/2017 for SCS reprogramming.  She returns to the clinic for evaluation of her mid and lower back pain and possible spinal cord stimulator reprogramming.  Ms. Una Villarreal reports almost 100% relief along with remarkable functional improvement with the use of her stimulator device. Patient reports significant relief of her previously severe neurogenic claudication.   Pain level is rated as 2/10 with the use of her spinal cord stimulator device.   Patient level ranges from 0/10 to 6/10 with the use of the spinal cord stimulator device.    Patient denies pain, numbness or weakness in the lower extremities  Patient denies any new bladder or bowel problems.   Patient denies difficulties with her balance or recent falls.    Review of previous therapies and additional medical records:  Una Villarreal has already failed the following measures, including:   Conservative measures: oral analgesics, opioids, topical analgesics, massage, physical therapy, ice, heat, chiropractic therapy and TENs   Interventional: SCS trial and implant  Surgical: No previous lumbar surgery  Una Villarreal underwent neurosurgical consultation with Dr. Ever Samaniego and was found not to be a surgical candidate.  I have reviewed Justice Report #[id number] consistent to medication reconciliation.  In terms of current analgesics, Una Villarreal takes: tizanidine, gabapentin, Tylenol, and compounded cream without side effects    Review of New Diagnostic Studies: There are no new diagnostic studies available for review.      The " "following portions of the patient's history were reviewed and updated as appropriate: problem list, past medical history, past surgery history, social history, family history, medications, and allergies    Review of Systems   HENT: Positive for ear pain.    Musculoskeletal: Positive for back pain and gait problem.   Allergic/Immunologic: Positive for environmental allergies.   Neurological: Positive for dizziness and headaches.   All other systems reviewed and are negative.    /84   Temp 97.5 °F (36.4 °C)   Ht 167.6 cm (66\")   Wt 101 kg (222 lb)   BMI 35.83 kg/m²       Physical Exam   Neurologic Exam  Constitutional: No acute distress.  Well appearing and well nourished.  Obese.  Head and face: Normal.   Eyes Conjunctiva and lids: No swelling, erythema or discharge. Pupils: Equal, round, and reactive to light.   Neck: Supple, symmetric, trachea midline, no masses.   Pulmonary: No increased work of breathing or signs of respiratory distress.  Clear to auscultation bilaterally.   Cardiovascular: Normal rate and rhythm, normal S1 and S2, no murmurs. Peripheral vascular exam: Normal.   Musculoskeletal Gait and station: Normal.   The range of motion of the lumbar spine is essentially full and without pain. Lumbar facet joint loading maneuvers are negative.   Gary and Gaenslen's tests are negative.  The range of motion of the hip joints is full and without pain. Palpation of the greater trochanters reproduces pain.  Neurologic   Cranial nerves: Cranial nerves II-XII intact.   Cortical function: Normal mental status.   Deep tendon reflexes: 2+ patellar bilaterally.  2+ Achilles bilaterally.  Straight leg raising test is negative.    Sensation: No sensory loss. Sensory exam: intact to light touch, intact pain and temperature sensation, intact vibration sensation and normal proprioception.   Skin and subcutaneous tissue: Normal without rashes or lesions.   Coordination: Normal finger to nose and heel to shin. " Coordination: normal balance and negative Romberg's sign.   Psychiatric: Judgment and insight: Normal. Orientation to person, place, and time: Normal. Recent and remote memory: Intact. Mood and affect: Normal.      PROCEDURE: Analysis of the spinal cord stimulator device with complex spinal cord stimulator reprogramming   Patient used the [employer] spinal cord stimulator device for 8,928 hours since last reprogramming, and 10,104 lifetime hours (average 24 hours per day). Analysis of impedence reveals normal impedence for all contacts.The spinal cord stimulator device was reprogrammed and five programs were created by adjusting electrode polarities, amplitude, pulse width,  pulse rate, BurstDR, micro-dosing, in the following fashion;  Program FIVE (Best Program):    Electrode polarities: 7-, 8+   Amplitude: .9-3.4 mA     Pulse width: 1000 mcs  Rate: 500 Hz    Patient experienced significant pain relief with coverage  in all areas of chronic pain.  Time spent reprogramming: 10 minutes  A copy of the telemetry report will be scanned in the patient's chart.    ASSESSMENT:   1. Lumbar stenosis with neurogenic claudication    2. Stenosis of lateral recess of lumbar spine    3. Lumbar facet arthropathy/lumbar spondylosis without myelopathy    4. DDD (degenerative disc disease), lumbar    5. Greater trochanteric bursitis of both hips    6. Myofascial pain    7. Moderate obesity    8. Restless legs syndrome (RLS)    9. History of alcohol dependence (CMS/HCC)    10. Other depression    11. Physical deconditioning    12. Encounter for fitting and adjustment of neuropacemaker of spinal cord       PLAN: Patient's chronic pain condition is significantly improved with her SCS device.  I have reviewed the above medical records, and discussed the patient with Dr. Alves.  1. Follow-up on an as needed basis.  2. Long-term rehabilitation efforts:  A. Start a comprehensive physical therapy program for reconditioning, water therapy,  therapeutic exercise, core strengthening, gait and balance training, neurodynamics, desensitization techniques, ultrasound, ASTYM, dry needling, and myofascial release   B. Continue efforts at weight loss  C. Contrast therapy  3.  Pharmacological measures: Reviewed and discussed.  A.  Continue lidocaine 10%, prilocaine 2%, baclofen 5%, cyclobenzaprine 4%, and ketoprofen 10% cream, apply 1 to 2 grams of cream to the affected areas every 4 to 6 hours as needed.  B.  Continue tizanidine 2 mg ½ to 1 tablet three times a day as needed for muscle spasms.   C.  Take Tylenol 500 mg four times daily as needed for mild to moderate breakthrough pain  D.  Continue Requip 0.25 mg 1-2 tablets at bedtime  E.  Continue gabapentin 100 mg 4 times a day  4. The patient has been instructed to contact my office with any questions or difficulties. The patient understands the plan and agrees to proceed accordingly.       Patient Care Team:  Zachary Reddy DO as PCP - General  CardonaRashid lanier MD as PCP - Claims Attributed  Cheikh Alves MD as Consulting Physician (Anesthesiology)  Ever Samaniego MD as Consulting Physician (Neurosurgery)  Eliot, Dee Roque PA-C as Physician Assistant (Physician Assistant)  John Morrison PA-C as Physician Assistant (Physician Assistant)  Terell Nixon MD as Consulting Physician (Gastroenterology)  Zuhair Gonzalez MD as Consulting Physician (Hand Surgery)     No orders of the defined types were placed in this encounter.        No future appointments.      John Morrison PA-C    EMR Dragon/Transcription disclaimer:  Much of this encounter note is an electronic transcription of spoken language to printed text. Electronic transcription of spoken language may permit erroneous, or at times, nonsensical words or phrases to be inadvertently transcribed. Although I have reviewed the note for such errors, some may still exist.

## 2018-12-20 ENCOUNTER — OFFICE VISIT (OUTPATIENT)
Dept: PAIN MEDICINE | Facility: CLINIC | Age: 68
End: 2018-12-20

## 2018-12-20 VITALS
BODY MASS INDEX: 35.68 KG/M2 | SYSTOLIC BLOOD PRESSURE: 146 MMHG | DIASTOLIC BLOOD PRESSURE: 84 MMHG | TEMPERATURE: 97.5 F | HEIGHT: 66 IN | WEIGHT: 222 LBS

## 2018-12-20 DIAGNOSIS — M51.36 DDD (DEGENERATIVE DISC DISEASE), LUMBAR: ICD-10-CM

## 2018-12-20 DIAGNOSIS — M48.061 STENOSIS OF LATERAL RECESS OF LUMBAR SPINE: ICD-10-CM

## 2018-12-20 DIAGNOSIS — M70.61 GREATER TROCHANTERIC BURSITIS OF BOTH HIPS: ICD-10-CM

## 2018-12-20 DIAGNOSIS — F32.89 OTHER DEPRESSION: ICD-10-CM

## 2018-12-20 DIAGNOSIS — G25.81 RESTLESS LEGS SYNDROME (RLS): ICD-10-CM

## 2018-12-20 DIAGNOSIS — R53.81 PHYSICAL DECONDITIONING: ICD-10-CM

## 2018-12-20 DIAGNOSIS — M79.18 MYOFASCIAL PAIN: ICD-10-CM

## 2018-12-20 DIAGNOSIS — Z46.2 ENCOUNTER FOR FITTING AND ADJUSTMENT OF NEUROPACEMAKER OF SPINAL CORD: ICD-10-CM

## 2018-12-20 DIAGNOSIS — M48.062 LUMBAR STENOSIS WITH NEUROGENIC CLAUDICATION: Primary | ICD-10-CM

## 2018-12-20 DIAGNOSIS — F10.21 HISTORY OF ALCOHOL DEPENDENCE (HCC): ICD-10-CM

## 2018-12-20 DIAGNOSIS — M70.62 GREATER TROCHANTERIC BURSITIS OF BOTH HIPS: ICD-10-CM

## 2018-12-20 DIAGNOSIS — M47.816 LUMBAR FACET ARTHROPATHY: ICD-10-CM

## 2018-12-20 DIAGNOSIS — E66.8 MODERATE OBESITY: ICD-10-CM

## 2018-12-20 PROCEDURE — 95972 ALYS CPLX SP/PN NPGT W/PRGRM: CPT | Performed by: PHYSICIAN ASSISTANT

## 2018-12-20 PROCEDURE — 99214 OFFICE O/P EST MOD 30 MIN: CPT | Performed by: PHYSICIAN ASSISTANT

## 2021-02-23 ENCOUNTER — OFFICE VISIT (OUTPATIENT)
Dept: NEUROLOGY | Facility: CLINIC | Age: 71
End: 2021-02-23

## 2021-02-23 VITALS
OXYGEN SATURATION: 98 % | HEIGHT: 64 IN | SYSTOLIC BLOOD PRESSURE: 122 MMHG | BODY MASS INDEX: 35.68 KG/M2 | WEIGHT: 209 LBS | DIASTOLIC BLOOD PRESSURE: 74 MMHG | HEART RATE: 88 BPM

## 2021-02-23 DIAGNOSIS — F32.89 OTHER DEPRESSION: Chronic | ICD-10-CM

## 2021-02-23 DIAGNOSIS — R41.3 MEMORY LOSS: Primary | ICD-10-CM

## 2021-02-23 PROCEDURE — 99204 OFFICE O/P NEW MOD 45 MIN: CPT | Performed by: PSYCHIATRY & NEUROLOGY

## 2021-02-23 RX ORDER — TRAZODONE HYDROCHLORIDE 100 MG/1
200 TABLET ORAL NIGHTLY
COMMUNITY
Start: 2021-01-20

## 2021-02-23 RX ORDER — LEVOTHYROXINE SODIUM 0.07 MG/1
TABLET ORAL
COMMUNITY
Start: 2021-01-21

## 2021-02-23 RX ORDER — FAMOTIDINE 40 MG/1
TABLET, FILM COATED ORAL
COMMUNITY
Start: 2021-01-20

## 2021-02-23 RX ORDER — DONEPEZIL HYDROCHLORIDE 5 MG/1
5 TABLET, FILM COATED ORAL NIGHTLY
Qty: 30 TABLET | Refills: 2 | Status: SHIPPED | OUTPATIENT
Start: 2021-02-23 | End: 2021-03-25 | Stop reason: SDUPTHER

## 2021-02-23 RX ORDER — PANTOPRAZOLE SODIUM 40 MG/1
TABLET, DELAYED RELEASE ORAL
COMMUNITY
Start: 2020-12-30

## 2021-02-23 RX ORDER — ALENDRONATE SODIUM 70 MG/1
TABLET ORAL
COMMUNITY
Start: 2020-12-23

## 2021-02-23 RX ORDER — ALBUTEROL SULFATE 2.5 MG/3ML
SOLUTION RESPIRATORY (INHALATION)
COMMUNITY

## 2021-02-23 NOTE — ASSESSMENT & PLAN NOTE
New onset of auditory and visual hallucinations    MRI Brain, EEG, labs     Start Aricept 5 mg daily

## 2021-02-23 NOTE — ASSESSMENT & PLAN NOTE
Patient's depression is recurrent and is severe with psychosis. Their depression is currently active and the condition is worsening. This will be reassessed at the next regular appointment. F/U as described:patient will continue current medication therapy.

## 2021-02-23 NOTE — PROGRESS NOTES
"Chief Complaint    Cognitive Impairment (NP)    Subjective          Una Villarreal presents to [facility] NEUROLOGY for cognitive impairment.     History of Present Illness    70 y.o. female referred by Dr Liam Cisneros.  Pt reports trouble with memory.  Pt has to think about how to turn on vacuum.. Trouble with familiar recipes.       has to remind her to finish tasks.     Mood is irritable and labile.     Sx started after loss of Father a year ago.      Admitted to [facility] Psych unit in January.  Hearing voices, radio.  Visual hallucinations.      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     MMSE 27/30     Falling for the last 3 months.  No injuries.    Reviewed medical records:    PMH of MDD admitted for a week.  Prozac stopped and Abilify started, increased Cymbalta.  Reports trouble with driving.  Question of pseudodementia due to mood disorder.         Objective   Vital Signs:   /74   Pulse 88   Ht 162.6 cm (64\")   Wt 94.8 kg (209 lb)   SpO2 98%   BMI 35.87 kg/m²     Physical Exam  Eyes:      Extraocular Movements: EOM normal.      Pupils: Pupils are equal, round, and reactive to light.   Neurological:      Mental Status: She is oriented to person, place, and time.      Coordination: Finger-Nose-Finger Test, Heel to Shin Test and Romberg Test normal.      Gait: Gait is intact. Tandem walk normal.      Deep Tendon Reflexes: Strength normal.   Psychiatric:         Speech: Speech normal.          Neurologic Exam     Mental Status   Oriented to person, place, and time.   Disoriented to area.   Disoriented to day.   Registration: recalls 3 of 3 objects. Recall at 5 minutes: recalls 2 of 3 objects. Follows 3 step commands.   Attention: normal. Concentration: normal.   Speech: speech is normal   Level of consciousness: alert  Knowledge: good and consistent with education.   Able to name object. Able to read. Able to repeat. Able to write. Normal comprehension.     Cranial Nerves "     CN II   Visual fields full to confrontation.   Visual acuity: normal  Right visual field deficit: none  Left visual field deficit: none     CN III, IV, VI   Pupils are equal, round, and reactive to light.  Extraocular motions are normal.   Nystagmus: none   Diplopia: none  Ophthalmoparesis: none  Upgaze: normal  Downgaze: normal  Conjugate gaze: present  Vestibulo-ocular reflex: present    CN V   Facial sensation intact.   Right corneal reflex: normal  Left corneal reflex: normal    CN VII   Right facial weakness: none  Left facial weakness: none    CN VIII   Hearing: intact    CN IX, X   Palate: symmetric  Right gag reflex: normal  Left gag reflex: normal    CN XI   Right sternocleidomastoid strength: normal  Left sternocleidomastoid strength: normal    CN XII   Tongue: not atrophic  Fasciculations: absent  Tongue deviation: none    Motor Exam   Muscle bulk: normal  Overall muscle tone: normal  Right arm tone: normal  Left arm tone: normal  Right leg tone: normal  Left leg tone: normal    Strength   Strength 5/5 throughout.     Sensory Exam   Light touch normal.   Pinprick normal.     Gait, Coordination, and Reflexes     Gait  Gait: normal    Coordination   Romberg: negative  Finger to nose coordination: normal  Heel to shin coordination: normal  Tandem walking coordination: normal    Tremor   Resting tremor: absent  Intention tremor: absent  Action tremor: absent    Reflexes   Reflexes 2+ except as noted.      Result Review :                 Assessment and Plan    Diagnoses and all orders for this visit:    1. Memory loss (Primary)  Assessment & Plan:  New onset of auditory and visual hallucinations    MRI Brain, EEG, labs     Start Aricept 5 mg daily     Orders:  -     CBC & Differential; Future  -     Comprehensive Metabolic Panel; Future  -     Vitamin B12 & Folate; Future  -     TSH; Future  -     Sedimentation Rate; Future  -     Ammonia; Future  -     RPR; Future  -     MRI Brain With & Without Contrast;  Future  -     EEG Awake or Drowsy Routine; Future    2. Other depression  Assessment & Plan:  Patient's depression is recurrent and is severe with psychosis. Their depression is currently active and the condition is worsening. This will be reassessed at the next regular appointment. F/U as described:patient will continue current medication therapy.      Other orders  -     donepezil (Aricept) 5 MG tablet; Take 1 tablet by mouth Every Night.  Dispense: 30 tablet; Refill: 2      Follow Up   No follow-ups on file.  Patient was given instructions and counseling regarding her condition or for health maintenance advice. Please see specific information pulled into the AVS if appropriate.

## 2021-02-24 LAB
ALBUMIN SERPL-MCNC: 3.9 G/DL (ref 3.5–5.2)
ALBUMIN/GLOB SERPL: 1.5 G/DL
ALP SERPL-CCNC: 83 U/L (ref 39–117)
ALT SERPL-CCNC: 17 U/L (ref 1–33)
AMMONIA PLAS-MCNC: 32 UMOL/L (ref 11–51)
AST SERPL-CCNC: 15 U/L (ref 1–32)
BASOPHILS # BLD AUTO: 0.02 10*3/MM3 (ref 0–0.2)
BASOPHILS NFR BLD AUTO: 0.4 % (ref 0–1.5)
BILIRUB SERPL-MCNC: 0.2 MG/DL (ref 0–1.2)
BUN SERPL-MCNC: 10 MG/DL (ref 8–23)
BUN/CREAT SERPL: 11.2 (ref 7–25)
CALCIUM SERPL-MCNC: 8.8 MG/DL (ref 8.6–10.5)
CHLORIDE SERPL-SCNC: 107 MMOL/L (ref 98–107)
CO2 SERPL-SCNC: 26.1 MMOL/L (ref 22–29)
CREAT SERPL-MCNC: 0.89 MG/DL (ref 0.57–1)
EOSINOPHIL # BLD AUTO: 0.08 10*3/MM3 (ref 0–0.4)
EOSINOPHIL NFR BLD AUTO: 1.7 % (ref 0.3–6.2)
ERYTHROCYTE [DISTWIDTH] IN BLOOD BY AUTOMATED COUNT: 12.4 % (ref 12.3–15.4)
ERYTHROCYTE [SEDIMENTATION RATE] IN BLOOD BY WESTERGREN METHOD: 11 MM/HR (ref 0–30)
FOLATE SERPL-MCNC: >20 NG/ML (ref 4.78–24.2)
GLOBULIN SER CALC-MCNC: 2.6 GM/DL
GLUCOSE SERPL-MCNC: 118 MG/DL (ref 65–99)
HCT VFR BLD AUTO: 35.4 % (ref 34–46.6)
HGB BLD-MCNC: 11.5 G/DL (ref 12–15.9)
IMM GRANULOCYTES # BLD AUTO: 0.01 10*3/MM3 (ref 0–0.05)
IMM GRANULOCYTES NFR BLD AUTO: 0.2 % (ref 0–0.5)
LYMPHOCYTES # BLD AUTO: 1.66 10*3/MM3 (ref 0.7–3.1)
LYMPHOCYTES NFR BLD AUTO: 35.2 % (ref 19.6–45.3)
MCH RBC QN AUTO: 31.9 PG (ref 26.6–33)
MCHC RBC AUTO-ENTMCNC: 32.5 G/DL (ref 31.5–35.7)
MCV RBC AUTO: 98.1 FL (ref 79–97)
MONOCYTES # BLD AUTO: 0.34 10*3/MM3 (ref 0.1–0.9)
MONOCYTES NFR BLD AUTO: 7.2 % (ref 5–12)
NEUTROPHILS # BLD AUTO: 2.61 10*3/MM3 (ref 1.7–7)
NEUTROPHILS NFR BLD AUTO: 55.3 % (ref 42.7–76)
NRBC BLD AUTO-RTO: 0 /100 WBC (ref 0–0.2)
PLATELET # BLD AUTO: 250 10*3/MM3 (ref 140–450)
POTASSIUM SERPL-SCNC: 4.2 MMOL/L (ref 3.5–5.2)
PROT SERPL-MCNC: 6.5 G/DL (ref 6–8.5)
RBC # BLD AUTO: 3.61 10*6/MM3 (ref 3.77–5.28)
RPR SER QL: NON REACTIVE
SODIUM SERPL-SCNC: 139 MMOL/L (ref 136–145)
TSH SERPL DL<=0.005 MIU/L-ACNC: 2.96 UIU/ML (ref 0.27–4.2)
VIT B12 SERPL-MCNC: 359 PG/ML (ref 211–946)
WBC # BLD AUTO: 4.72 10*3/MM3 (ref 3.4–10.8)

## 2021-03-15 ENCOUNTER — TELEPHONE (OUTPATIENT)
Dept: NEUROLOGY | Facility: CLINIC | Age: 71
End: 2021-03-15

## 2021-03-15 RX ORDER — TOPIRAMATE 100 MG/1
100 TABLET, FILM COATED ORAL 2 TIMES DAILY
Qty: 60 TABLET | Refills: 2 | Status: SHIPPED | OUTPATIENT
Start: 2021-03-15 | End: 2021-07-07

## 2021-03-15 NOTE — TELEPHONE ENCOUNTER
Spoke with Una and she had us take off the TPM 50 mg at her last appt, but has been taking 100mg, 2 tablets nightly instead until she ran out a couple of weeks ago.  Are you ok with putting her back on this as she states without it her Headaches have returned.

## 2021-03-17 ENCOUNTER — HOSPITAL ENCOUNTER (OUTPATIENT)
Dept: MRI IMAGING | Facility: HOSPITAL | Age: 71
Discharge: HOME | End: 2021-03-17

## 2021-03-17 ENCOUNTER — HOSPITAL ENCOUNTER (OUTPATIENT)
Dept: NEUROLOGY | Facility: HOSPITAL | Age: 71
Discharge: HOME | End: 2021-03-17

## 2021-03-17 DIAGNOSIS — R41.3 MEMORY LOSS: ICD-10-CM

## 2021-03-17 PROCEDURE — 95816 EEG AWAKE AND DROWSY: CPT

## 2021-03-17 PROCEDURE — 70553 MRI BRAIN STEM W/O & W/DYE: CPT

## 2021-03-17 PROCEDURE — A9577 INJ MULTIHANCE: HCPCS | Performed by: PSYCHIATRY & NEUROLOGY

## 2021-03-17 PROCEDURE — 0 GADOBENATE DIMEGLUMINE 529 MG/ML SOLUTION: Performed by: PSYCHIATRY & NEUROLOGY

## 2021-03-17 PROCEDURE — 95816 EEG AWAKE AND DROWSY: CPT | Performed by: PSYCHIATRY & NEUROLOGY

## 2021-03-17 RX ADMIN — GADOBENATE DIMEGLUMINE 19 ML: 529 INJECTION, SOLUTION INTRAVENOUS at 11:17

## 2021-03-25 ENCOUNTER — OFFICE VISIT (OUTPATIENT)
Dept: NEUROLOGY | Facility: CLINIC | Age: 71
End: 2021-03-25

## 2021-03-25 VITALS
OXYGEN SATURATION: 99 % | BODY MASS INDEX: 35.85 KG/M2 | HEIGHT: 64 IN | WEIGHT: 210 LBS | HEART RATE: 66 BPM | SYSTOLIC BLOOD PRESSURE: 128 MMHG | DIASTOLIC BLOOD PRESSURE: 76 MMHG

## 2021-03-25 DIAGNOSIS — F32.89 OTHER DEPRESSION: Chronic | ICD-10-CM

## 2021-03-25 DIAGNOSIS — R41.3 MEMORY LOSS: Primary | Chronic | ICD-10-CM

## 2021-03-25 PROCEDURE — 99214 OFFICE O/P EST MOD 30 MIN: CPT | Performed by: PSYCHIATRY & NEUROLOGY

## 2021-03-25 RX ORDER — LEVOCETIRIZINE DIHYDROCHLORIDE 5 MG/1
TABLET, FILM COATED ORAL
COMMUNITY
Start: 2021-03-20 | End: 2021-06-09

## 2021-03-25 RX ORDER — DONEPEZIL HYDROCHLORIDE 10 MG/1
10 TABLET, FILM COATED ORAL NIGHTLY
Qty: 90 TABLET | Refills: 2 | Status: SHIPPED | OUTPATIENT
Start: 2021-03-25 | End: 2022-03-25

## 2021-03-25 RX ORDER — TRAMADOL HYDROCHLORIDE 50 MG/1
TABLET ORAL
COMMUNITY
Start: 2021-03-22

## 2021-03-25 NOTE — ASSESSMENT & PLAN NOTE
Patient's depression is recurrent and is severe with psychosis. Their depression is currently active and the condition is worsening. This will be reassessed at the next regular appointment. F/U as described:patient referred to Mental Health Specialist.

## 2021-03-25 NOTE — PROGRESS NOTES
"Chief Complaint  Memory Loss    Subjective          Una Villarreal presents to [facility] NEUROLOG[facility] for memory loss.     History of Present Illness    70 y.o. female returns in follow up.  Last visit on 2/23/21 rx Aricept, ordered MRI Brain, EEG, labs.    MRI Brain, my review of films, 3/17/21 R > L volume loss mesial temporal lobes    EEG - normal     Labs CBC, CMP, TSH, B12, RPR, NH3, ESR - NCS     Pt is unsure of what medications she is currently on.  Mood is unstable and tearful.  Visual hallucinations which she is fearful.      No difference noted with starting Aricept.     Problem history:    Pt reports trouble with memory.  Pt has to think about how to turn on vacuum.. Trouble with familiar recipes.        has to remind her to finish tasks.      Mood is irritable and labile.      Sx started after loss of Father a year ago.       Admitted to [facility] Psych unit in January.  Hearing voices, radio.  Visual hallucinations.       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      MMSE 27/30      Falling for the last 3 months.  No injuries.     Reviewed medical records:     PMH of MDD admitted for a week.  Prozac stopped and Abilify started, increased Cymbalta.  Reports trouble with driving.  Question of pseudodementia due to mood disorder.               Objective   Vital Signs:   /76   Pulse 66   Ht 162.6 cm (64.02\")   Wt 95.3 kg (210 lb)   SpO2 99%   BMI 36.03 kg/m²     Physical Exam  Eyes:      Extraocular Movements: EOM normal.      Pupils: Pupils are equal, round, and reactive to light.   Neurological:      Mental Status: She is oriented to person, place, and time.      Gait: Gait is intact.      Deep Tendon Reflexes: Strength normal.   Psychiatric:         Speech: Speech normal.          Neurologic Exam     Mental Status   Oriented to person, place, and time.   Speech: speech is normal   Level of consciousness: alert  Knowledge: good and consistent with education.   Normal " comprehension.     Cranial Nerves   Cranial nerves II through XII intact.     CN II   Visual fields full to confrontation.   Visual acuity: normal  Right visual field deficit: none  Left visual field deficit: none     CN III, IV, VI   Pupils are equal, round, and reactive to light.  Extraocular motions are normal.   Nystagmus: none   Diplopia: none  Ophthalmoparesis: none  Upgaze: normal  Downgaze: normal  Conjugate gaze: present    CN V   Facial sensation intact.   Right corneal reflex: normal  Left corneal reflex: normal    CN VII   Right facial weakness: none  Left facial weakness: none    CN VIII   Hearing: intact    CN IX, X   Palate: symmetric  Right gag reflex: normal  Left gag reflex: normal    CN XI   Right sternocleidomastoid strength: normal  Left sternocleidomastoid strength: normal    CN XII   Tongue: not atrophic  Fasciculations: absent  Tongue deviation: none    Motor Exam   Muscle bulk: normal  Overall muscle tone: normal    Strength   Strength 5/5 throughout.     Sensory Exam   Light touch normal.     Gait, Coordination, and Reflexes     Gait  Gait: normal    Tremor   Resting tremor: absent  Intention tremor: absent  Action tremor: absent    Reflexes   Reflexes 2+ except as noted.      Result Review :{Labs  Result Review  Imaging  Med Tab  Media :23}   The following data was reviewed by: Terell Talley MD on 03/25/2021:  Common labs    Common Labsle 2/23/21 2/23/21    0000 0000   Glucose  118 (A)   BUN  10   Creatinine  0.89   eGFR Non  Am  63   eGFR  Am  76   Sodium  139   Potassium  4.2   Chloride  107   Calcium  8.8   Total Protein  6.5   Albumin  3.90   Total Bilirubin  0.2   Alkaline Phosphatase  83   AST (SGOT)  15   ALT (SGPT)  17   WBC 4.72    Hemoglobin 11.5 (A)    Hematocrit 35.4    Platelets 250    (A) Abnormal value       Comments are available for some flowsheets but are not being displayed.           Data reviewed: Radiologic studies MRI Brain and EEG            Assessment and Plan    Diagnoses and all orders for this visit:    1. 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 Other depression  Assessment & Plan:  Patient's depression is recurrent and is severe with psychosis. Their depression is currently active and the condition is worsening. This will be reassessed at the next regular appointment. F/U as described:patient referred to Mental Health Specialist.      Other orders  -     donepezil (Aricept) 10 MG tablet; Take 1 tablet by mouth Every Night.  Dispense: 90 tablet; Refill: 2      Follow Up   No follow-ups on file.  Patient was given instructions and counseling regarding her condition or for health maintenance advice. Please see specific information pulled into the AVS if appropriate.

## 2021-04-01 ENCOUNTER — IMMUNIZATION (OUTPATIENT)
Dept: VACCINE CLINIC | Facility: HOSPITAL | Age: 71
End: 2021-04-01

## 2021-04-01 PROCEDURE — 0001A: CPT | Performed by: INTERNAL MEDICINE

## 2021-04-01 PROCEDURE — 91300 HC SARSCOV02 VAC 30MCG/0.3ML IM: CPT | Performed by: INTERNAL MEDICINE

## 2021-04-06 ENCOUNTER — TRANSCRIBE ORDERS (OUTPATIENT)
Dept: PULMONOLOGY | Facility: CLINIC | Age: 71
End: 2021-04-06

## 2021-04-08 DIAGNOSIS — Z00.6 EXAMINATION FOR NORMAL COMPARISON OR CONTROL IN CLINICAL RESEARCH: Primary | ICD-10-CM

## 2021-04-15 RX ORDER — TOPIRAMATE 100 MG/1
TABLET, FILM COATED ORAL
Qty: 180 TABLET | Refills: 3 | OUTPATIENT
Start: 2021-04-15

## 2021-04-15 NOTE — TELEPHONE ENCOUNTER
Refill request: TPM    This was filled on 3/15 and still has 2 remaining refills. Patient just needs to call her pharmacy to get it ready or as she uses [facility] mail delivery should automatically send her the refill/however she has this set up with them.

## 2021-04-20 ENCOUNTER — OFFICE VISIT (OUTPATIENT)
Dept: PULMONOLOGY | Facility: CLINIC | Age: 71
End: 2021-04-20

## 2021-04-20 VITALS
BODY MASS INDEX: 35.51 KG/M2 | HEART RATE: 85 BPM | WEIGHT: 208 LBS | SYSTOLIC BLOOD PRESSURE: 122 MMHG | HEIGHT: 64 IN | OXYGEN SATURATION: 92 % | TEMPERATURE: 97.7 F | DIASTOLIC BLOOD PRESSURE: 80 MMHG

## 2021-04-20 DIAGNOSIS — Z00.6 EXAMINATION OF PARTICIPANT IN CLINICAL TRIAL: Primary | ICD-10-CM

## 2021-04-20 DIAGNOSIS — R91.1 LUNG NODULE: Primary | ICD-10-CM

## 2021-04-20 PROCEDURE — 99204 OFFICE O/P NEW MOD 45 MIN: CPT | Performed by: INTERNAL MEDICINE

## 2021-04-20 NOTE — PROGRESS NOTES
"CC:    Cough / hemoptysis    HPI:    72 y/o WF w/ h/o ~45 py smoking - quit 1990, Depression, long standing allergic rhinitis, allergy immunotherapy, prior sinus surgery in 2015, who comes today for evaluation of hemoptysis.  Patient had an episode earlier this month where she felt like she had bronchitis.  Had some mild hemoptysis (blood tinged sputum) off an on for around 1-2 weeks.  It has since resolved.  No fever, chills, weight loss, night sweats, etc.      PMH:    Past Medical History:   Diagnosis Date   • Alcohol dependence (CMS/HCC)     continuous drinking   • Arthritis    • Asthma    • Back pain    • Depression    • Diabetes insipidus (CMS/HCC)    • Disease of thyroid gland    • Encephalitis    • Facet syndrome, lumbar    • Generalized seizure (CMS/HCC) 2/21/2017   • GERD (gastroesophageal reflux disease)    • Glaucoma     bilat eye   • Headache    • Hiatal hernia    • Hypertension    • Kidney stone     present but small- no discomfort at this time    • Lumbar canal stenosis    • Meniscus tear     right knee   • Migraine    • Pituitary cyst (CMS/HCC)    • RA (rheumatoid arthritis) (CMS/HCC)    • Seizure (CMS/HCC)     only occured from mylogram-    • Tooth wear     tooth cap in place- upper front    • Wears glasses      PSH:    Past Surgical History:   Procedure Laterality Date   • APPENDECTOMY     • BRAIN SURGERY  2007    pituitary tumor   • CARPAL TUNNEL RELEASE      left   • CARPAL TUNNEL RELEASE WITH CUBITAL TUNNEL RELEASE Right    • CHOLECYSTECTOMY     • COLONOSCOPY     • ELBOW PROCEDURE  1996    \"fish cutters elbow\" repair- left   • ENDOSCOPY     • HYSTERECTOMY      first partial , then later removed ovaries when tumor showed up    • KNEE CARTILAGE SURGERY  2005    right    • OOPHORECTOMY      bilat ovaries when found tumor so went back after partial hysterectomy    • SPINAL CORD STIMULATOR IMPLANT N/A 10/23/2017    Procedure: SPINAL CORD STIMULATOR INSERTION PHASE 1 [place];  Surgeon: Ever MILLER" MD [place];  [facility];  Service:    • TONSILLECTOMY      unsure adnoids - age 7    • TRIGGER FINGER RELEASE Left 2017   • TUBAL ABDOMINAL LIGATION     • TUMOR REMOVAL      fatty tumor on right shoulder   • WISDOM TOOTH EXTRACTION      x3 then later removed 4th      FH:    Family History   Problem Relation Age of Onset   • Lung cancer Maternal Grandmother    • Stroke Maternal Grandfather    • Lung cancer Maternal Grandfather    • Stroke Paternal Grandmother    • Brain cancer Other    • COPD Mother    • Heart failure Father      SH:    Social History     Socioeconomic History   • Marital status:      Spouse name: Not on file   • Number of children: Not on file   • Years of education: Not on file   • Highest education level: Not on file   Tobacco Use   • Smoking status: Former Smoker     Packs/day: 1.00     Years: 25.00     Pack years: 25.00     Types: Cigarettes     Quit date: 2/15/1987     Years since quittin.2   • Smokeless tobacco: Never Used   • Tobacco comment: did get up to 3 ppd but didnt do it long    Vaping Use   • Vaping Use: Never used   Substance and Sexual Activity   • Alcohol use: Yes     Comment: socially- but tries to drink non alcoholic since when does drink just keeps going- doesnt feel effects of alcohol so keeps drinking    • Drug use: No   • Sexual activity: Defer     ALLERGIES:    Allergies   Allergen Reactions   • Aspirin Anaphylaxis   • Other Anaphylaxis     strawberries   • Bactrim [Sulfamethoxazole-Trimethoprim] GI Intolerance     And crying    • Barley Grass GI Intolerance   • Codeine Hives   • Duloxetine Hcl Hives   • Eggs Or Egg-Derived Products GI Intolerance   • Percocet [Oxycodone-Acetaminophen] Nausea And Vomiting   • Trichophyton Other (See Comments)     Unsure reaction    • Latex Rash     MEDICATIONS:      Current Outpatient Medications:   •  albuterol (PROVENTIL) (2.5 MG/3ML) 0.083% nebulizer solution, albuterol sulfate 2.5 mg/3 mL (0.083 %) solution  for nebulization, Disp: , Rfl:   •  alendronate (FOSAMAX) 70 MG tablet, , Disp: , Rfl:   •  budesonide-formoterol (SYMBICORT) 160-4.5 MCG/ACT inhaler, Inhale 2 puffs As Needed (soa)., Disp: , Rfl:   •  donepezil (Aricept) 10 MG tablet, Take 1 tablet by mouth Every Night., Disp: 90 tablet, Rfl: 2  •  EPINEPHrine (EPIPEN 2-MARIELLE) 0.3 MG/0.3ML solution auto-injector injection, EpiPen 2-Marielle 0.3 MG/0.3ML KATHY; Patient Sig: EpiPen 2-Marielle 0.3 MG/0.3ML KATHY USE AS DIRECTED AS NEEDED; 0; 28-Jan-2014; Active; STRAWBERRY ALLERGY, Disp: , Rfl:   •  famotidine (PEPCID) 40 MG tablet, , Disp: , Rfl:   •  levocetirizine (XYZAL) 5 MG tablet, , Disp: , Rfl:   •  levothyroxine (SYNTHROID, LEVOTHROID) 75 MCG tablet, , Disp: , Rfl:   •  Mometasone Furo-Formoterol Fum (DULERA IN), Inhale 2 puffs Daily., Disp: , Rfl:   •  pantoprazole (PROTONIX) 40 MG EC tablet, , Disp: , Rfl:   •  pravastatin (PRAVACHOL) 40 MG tablet, Take 40 mg by mouth Daily., Disp: , Rfl:   •  sennosides-docusate sodium (SENOKOT-S) 8.6-50 MG tablet, Take 2 tablets by mouth Daily. Hold for loose stools., Disp: 60 tablet, Rfl: 1  •  SUMAtriptan (IMITREX) 6 MG/0.5ML solution injection, Inject  under the skin 1 (One) Time As Needed for Migraine., Disp: , Rfl:   •  topiramate (Topamax) 100 MG tablet, Take 1 tablet by mouth 2 (Two) Times a Day., Disp: 60 tablet, Rfl: 2  •  traMADol (ULTRAM) 50 MG tablet, , Disp: , Rfl:   •  traZODone (DESYREL) 100 MG tablet, 200 mg Every Night., Disp: , Rfl:   ROS:  Per HPI, otherwise all systems reviewed and negative.    DIAGNOSTIC DATA (Reviewed and interpreted by me unless otherwise specified):    CT Chest 4/6/21 - mosaic attenuation, 5 mm nodule right major fissure near apex, prior granulomatous disease, calcified lymph node near RML take off could be source of hemoptysis / broncholithiasis    Vitals:    04/20/21 0805   BP: 122/80   Pulse: 85   Temp: 97.7 °F (36.5 °C)   SpO2: 92%       Physical Exam   Constitutional: Oriented to person,  place, and time. Appears well-developed and well-nourished.   Head: Normocephalic and atraumatic.   Nose: Nose normal.   Mouth/Throat: Oropharynx is clear and moist.   Eyes: Conjunctivae are normal.  Pupils normal.  Neck: No tracheal deviation present.   Cardiovascular: Normal rate, regular rhythm, normal heart sounds and intact distal pulses.  Exam reveals no gallop and no friction rub.  No thrill.  No JVD.  No edema.  No murmur heard.  Pulmonary/Chest: Effort normal and breath sounds normal.  No tenderness to palpation.  No clubbing.   Abdominal: Soft. Bowel sounds are normal. No distension. No tenderness. There is no guarding.   Musculoskeletal: Normal range of motion.  No tenderness.  Lymphadenopathy:  No cervical adenopathy.   Neurological:  No new focal neurological deficits observed   Skin: Skin is warm and dry. No rash noted.   Psychiatric: Normal mood and affect.  Behavior is normal. Judgment normal.    Assessment/Plan     1)  Hemoptysis - resolved.  Likely bronchitis.  Could be related to calcified lymph node in right hilum which has potential to become a broncholith.  Will plan on repeating CT Chest w/o contrast in 6 months to follow up 5 mm RUL nodule.  If bronchitis re-occurs she will need a bronchoscopy.  Encouraged more regular use of dulera bid.    RTC 6 months w/ Full PFT and CT Chest w/o contrast    Jeancarlos Neal MD  Pulmonology and Critical Care Medicine  04/20/21 09:51 EDT  Electronically Signed    C.C.:  Truong Vick MD, Liam Cisneros MD

## 2021-04-22 ENCOUNTER — IMMUNIZATION (OUTPATIENT)
Dept: VACCINE CLINIC | Facility: HOSPITAL | Age: 71
End: 2021-04-22

## 2021-04-22 PROCEDURE — 91300 HC SARSCOV02 VAC 30MCG/0.3ML IM: CPT | Performed by: INTERNAL MEDICINE

## 2021-04-22 PROCEDURE — 0002A: CPT | Performed by: INTERNAL MEDICINE

## 2021-05-27 ENCOUNTER — TELEPHONE (OUTPATIENT)
Dept: NEUROLOGY | Facility: CLINIC | Age: 71
End: 2021-05-27

## 2021-05-27 NOTE — TELEPHONE ENCOUNTER
Caller: TIFFANIE     Relationship to patient: PTS      Best call back number: [phone]    Chief complaint: CALLER STATES THAT SHE IS HAVING SOME EPISODES THAT SHE DOESN'T KNOW WHERE SHE'S AT AND SHE FREAKED OUT LAST NIGHT BECAUSE THE LIGHTS WERE OUT AND DIDN'T KNOW WHERE SHE WAS. IT REALLY SCARED HIM AND HE FEELS THAT SHE NEEDS TO BE LOOKED AT. IF SHE CAN'T GET INTO [facility] AND THIS CONTINUES HE THINKS HE SHOULD TAKE HER TO THE HOSPITAL.     Type of visit: FU     Requested date: SOON      If rescheduling, when is the original appointment: SHE DOESN'T HAVE AN APPT. HE CALLED HER OTHER DR AND SHE SAID TO MAKE AN APPT WITH NEURO IN THE NEXT WEEK DR GOMEZ DIDN'T HAVE ANYTHING UNTIL July.      Additional notes: PLEASE ADVISE.

## 2021-06-09 ENCOUNTER — LAB (OUTPATIENT)
Dept: LAB | Facility: HOSPITAL | Age: 71
End: 2021-06-09

## 2021-06-09 ENCOUNTER — TELEPHONE (OUTPATIENT)
Dept: NEUROLOGY | Facility: CLINIC | Age: 71
End: 2021-06-09

## 2021-06-09 ENCOUNTER — OFFICE VISIT (OUTPATIENT)
Dept: NEUROLOGY | Facility: CLINIC | Age: 71
End: 2021-06-09

## 2021-06-09 VITALS
SYSTOLIC BLOOD PRESSURE: 162 MMHG | OXYGEN SATURATION: 98 % | HEIGHT: 64 IN | HEART RATE: 84 BPM | DIASTOLIC BLOOD PRESSURE: 88 MMHG | BODY MASS INDEX: 33.97 KG/M2 | WEIGHT: 199 LBS

## 2021-06-09 DIAGNOSIS — F32.89 OTHER DEPRESSION: ICD-10-CM

## 2021-06-09 DIAGNOSIS — R23.2 HOT FLASHES: ICD-10-CM

## 2021-06-09 DIAGNOSIS — F99 INSOMNIA DUE TO OTHER MENTAL DISORDER: ICD-10-CM

## 2021-06-09 DIAGNOSIS — R41.3 MEMORY LOSS: Primary | ICD-10-CM

## 2021-06-09 DIAGNOSIS — E03.9 ACQUIRED HYPOTHYROIDISM: ICD-10-CM

## 2021-06-09 DIAGNOSIS — F51.05 INSOMNIA DUE TO OTHER MENTAL DISORDER: ICD-10-CM

## 2021-06-09 PROCEDURE — 99214 OFFICE O/P EST MOD 30 MIN: CPT | Performed by: NURSE PRACTITIONER

## 2021-06-09 RX ORDER — QUETIAPINE FUMARATE 25 MG/1
25 TABLET, FILM COATED ORAL NIGHTLY
Qty: 30 TABLET | Refills: 2 | Status: SHIPPED | OUTPATIENT
Start: 2021-06-09 | End: 2021-07-07 | Stop reason: ALTCHOICE

## 2021-06-09 RX ORDER — TIZANIDINE 4 MG/1
4 TABLET ORAL 3 TIMES DAILY
COMMUNITY
Start: 2021-05-17

## 2021-06-09 RX ORDER — FLUOXETINE HYDROCHLORIDE 40 MG/1
40 CAPSULE ORAL 2 TIMES DAILY
COMMUNITY

## 2021-06-09 RX ORDER — CETIRIZINE HYDROCHLORIDE 10 MG/1
TABLET ORAL
COMMUNITY
Start: 2021-05-25

## 2021-06-09 NOTE — PROGRESS NOTES
Subjective:     Patient ID: Una Villarreal is a 71 y.o. female.    CC:   Chief Complaint   Patient presents with   • Memory Loss       HPI:   History of Present Illness     Ms Villarreal is here w her  to follow up on her memory decline.  Last visit 3/25/2021 w Dr Talley. Increased Aricept to 10 mg. Patient and  do not see an improvement. He describes 2 episodes of her waking up in the chair and not knowing where she is, and being afraid.  MMSE  3/25/21 27/30  Her emotional status is declining with worsening depression. Not motivated to get dressed or get out the chair. Spends her time playing on her ipad or looking out the window.  Talking to a therapist from [place] every 2 months on the phone. She is unsure if they will be prescribing meds or not. Did not like her previous psychiatrist because he changed her meds. Currently on Fluoxetine 40 bid. Not taking Abilify or Cymbalta.  She is not sleeping because she is out of Trazodone and waiting on a delivery from [facility]. She complains of shaking, chills and hot flashes that is worse w anxiety. No fever, pain, dysuria.  Has hypothyroidism. Taking meds. Unsure of last TSH level.  MRI 3/17/21 w volume loss R>L temporal lobes.  Labs nml.  No recent hallucinations.    PCP is Dr Cisneros.      The following portions of the patient's history were reviewed and updated as appropriate: allergies, current medications, past family history, past medical history, past social history, past surgical history and problem list.      Current Outpatient Medications:   •  albuterol (PROVENTIL) (2.5 MG/3ML) 0.083% nebulizer solution, albuterol sulfate 2.5 mg/3 mL (0.083 %) solution for nebulization, Disp: , Rfl:   •  alendronate (FOSAMAX) 70 MG tablet, , Disp: , Rfl:   •  budesonide-formoterol (SYMBICORT) 160-4.5 MCG/ACT inhaler, Inhale 2 puffs As Needed (soa)., Disp: , Rfl:   •  cetirizine (zyrTEC) 10 MG tablet, , Disp: , Rfl:   •  donepezil (Aricept) 10 MG tablet, Take 1  tablet by mouth Every Night., Disp: 90 tablet, Rfl: 2  •  EPINEPHrine (EPIPEN 2-MARIELLE) 0.3 MG/0.3ML solution auto-injector injection, EpiPen 2-Marielle 0.3 MG/0.3ML KATHY; Patient Sig: EpiPen 2-Marielle 0.3 MG/0.3ML KATHY USE AS DIRECTED AS NEEDED; 0; 28-Jan-2014; Active; STRAWBERRY ALLERGY, Disp: , Rfl:   •  famotidine (PEPCID) 40 MG tablet, , Disp: , Rfl:   •  FLUoxetine (PROzac) 40 MG capsule, Take 40 mg by mouth 2 (Two) Times a Day., Disp: , Rfl:   •  levothyroxine (SYNTHROID, LEVOTHROID) 75 MCG tablet, , Disp: , Rfl:   •  Mometasone Furo-Formoterol Fum (DULERA IN), Inhale 2 puffs Daily., Disp: , Rfl:   •  pantoprazole (PROTONIX) 40 MG EC tablet, , Disp: , Rfl:   •  pravastatin (PRAVACHOL) 40 MG tablet, Take 40 mg by mouth Daily., Disp: , Rfl:   •  sennosides-docusate sodium (SENOKOT-S) 8.6-50 MG tablet, Take 2 tablets by mouth Daily. Hold for loose stools., Disp: 60 tablet, Rfl: 1  •  SUMAtriptan (IMITREX) 6 MG/0.5ML solution injection, Inject  under the skin 1 (One) Time As Needed for Migraine., Disp: , Rfl:   •  tiZANidine (ZANAFLEX) 4 MG tablet, Take 4 mg by mouth 3 (Three) Times a Day., Disp: , Rfl:   •  topiramate (Topamax) 100 MG tablet, Take 1 tablet by mouth 2 (Two) Times a Day., Disp: 60 tablet, Rfl: 2  •  traMADol (ULTRAM) 50 MG tablet, , Disp: , Rfl:   •  traZODone (DESYREL) 100 MG tablet, 200 mg Every Night., Disp: , Rfl:   •  QUEtiapine (SEROquel) 25 MG tablet, Take 1 tablet by mouth Every Night., Disp: 30 tablet, Rfl: 2     Past Medical History:   Diagnosis Date   • Alcohol dependence (CMS/HCC)     continuous drinking   • Arthritis    • Asthma    • Back pain    • Depression    • Diabetes insipidus (CMS/HCC)    • Disease of thyroid gland    • Encephalitis    • Facet syndrome, lumbar    • Generalized seizure (CMS/HCC) 2/21/2017   • GERD (gastroesophageal reflux disease)    • Glaucoma     bilat eye   • Headache    • Hiatal hernia    • Hypertension    • Kidney stone     present but small- no discomfort at this time  "   • Lumbar canal stenosis    • Meniscus tear     right knee   • Migraine    • Pituitary cyst (CMS/HCC)    • RA (rheumatoid arthritis) (CMS/HCC)    • Seizure (CMS/HCC)     only occured from mylogram-    • Tooth wear     tooth cap in place- upper front    • Wears glasses        Past Surgical History:   Procedure Laterality Date   • APPENDECTOMY     • BRAIN SURGERY      pituitary tumor   • CARPAL TUNNEL RELEASE      left   • CARPAL TUNNEL RELEASE WITH CUBITAL TUNNEL RELEASE Right    • CHOLECYSTECTOMY     • COLONOSCOPY     • ELBOW PROCEDURE      \"fish cutters elbow\" repair- left   • ENDOSCOPY     • HYSTERECTOMY      first partial , then later removed ovaries when tumor showed up    • KNEE CARTILAGE SURGERY      right    • OOPHORECTOMY      bilat ovaries when found tumor so went back after partial hysterectomy    • SPINAL CORD STIMULATOR IMPLANT N/A 10/23/2017    Procedure: SPINAL CORD STIMULATOR INSERTION PHASE 1 [facility];  Surgeon: Ever Samaniego MD;  Location: [facility];  Service:    • TONSILLECTOMY      unsure adnoids - age 7    • TRIGGER FINGER RELEASE Left 2017   • TUBAL ABDOMINAL LIGATION     • TUMOR REMOVAL      fatty tumor on right shoulder   • WISDOM TOOTH EXTRACTION      x3 then later removed 4th        Social History     Socioeconomic History   • Marital status:      Spouse name: Not on file   • Number of children: Not on file   • Years of education: Not on file   • Highest education level: Not on file   Tobacco Use   • Smoking status: Former Smoker     Packs/day: 1.00     Years: 25.00     Pack years: 25.00     Types: Cigarettes     Quit date: 2/15/1987     Years since quittin.3   • Smokeless tobacco: Never Used   • Tobacco comment: did get up to 3 ppd but didnt do it long    Vaping Use   • Vaping Use: Never used   Substance and Sexual Activity   • Alcohol use: Yes     Comment: socially- but tries to drink non alcoholic since when does drink just keeps going- doesnt feel " "effects of alcohol so keeps drinking    • Drug use: No   • Sexual activity: Defer       Family History   Problem Relation Age of Onset   • Lung cancer Maternal Grandmother    • Stroke Maternal Grandfather    • Lung cancer Maternal Grandfather    • Stroke Paternal Grandmother    • Brain cancer Other    • COPD Mother    • Heart failure Father           Objective:  /88   Pulse 84   Ht 162.6 cm (64.02\")   Wt 90.3 kg (199 lb)   SpO2 98%   BMI 34.14 kg/m²     Neurologic Exam     Mental Status   Oriented to person, place, and time.   Follows 3 step commands.   Attention: decreased. Concentration: normal.   Speech: speech is normal   Level of consciousness: alert  Knowledge: consistent with education.   Normal comprehension.     Cranial Nerves     CN III, IV, VI   Pupils are equal, round, and reactive to light.  Right pupil: Accommodation: intact.   Left pupil: Accommodation: intact.   CN III: no CN III palsy  CN VI: no CN VI palsy  Nystagmus: none   Diplopia: none  Upgaze: normal  Downgaze: normal  Conjugate gaze: present    CN VII   Facial expression full, symmetric.     CN VIII   Hearing: intact    CN XII   CN XII normal.     Motor Exam   Muscle bulk: normal  Overall muscle tone: normal    Strength   Right biceps: 5/5  Left biceps: 5/5  Right triceps: 5/5  Left triceps: 5/5  Right interossei: 5/5  Left interossei: 5/5  Right quadriceps: 5/5  Left quadriceps: 5/5  Right anterior tibial: 5/5  Left anterior tibial: 5/5  Right posterior tibial: 5/5  Left posterior tibial: 5/5    Sensory Exam   Light touch normal.     Gait, Coordination, and Reflexes     Gait  Gait: normal    Coordination   Romberg: negative  Finger to nose coordination: normal  Heel to shin coordination: normal    Tremor   Resting tremor: present  Intention tremor: absent  Action tremor: absent    Reflexes   Right brachioradialis: 2+  Left brachioradialis: 2+  Right biceps: 2+  Left biceps: 2+  Right triceps: 2+  Left triceps: 2+  Right patellar: " 2+  Left patellar: 2+  Right achilles: 2+  Left achilles: 2+  Right : 2+  Left : 2+      Physical Exam  Eyes:      Pupils: Pupils are equal, round, and reactive to light.   Neurological:      Mental Status: She is oriented to person, place, and time.      Coordination: Finger-Nose-Finger Test, Heel to Shin Test and Romberg Test normal.      Gait: Gait is intact.      Deep Tendon Reflexes:      Reflex Scores:       Tricep reflexes are 2+ on the right side and 2+ on the left side.       Bicep reflexes are 2+ on the right side and 2+ on the left side.       Brachioradialis reflexes are 2+ on the right side and 2+ on the left side.       Patellar reflexes are 2+ on the right side and 2+ on the left side.       Achilles reflexes are 2+ on the right side and 2+ on the left side.  Psychiatric:         Speech: Speech normal.            Assessment/Plan:       Diagnoses and all orders for this visit:    1. Memory loss (Primary)  -     T4, Free  -     Vitamin B12    2. Other depression    3. Hot flashes  -     CBC & Differential  -     Comprehensive Metabolic Panel    4. Insomnia due to other mental disorder  -     QUEtiapine (SEROquel) 25 MG tablet; Take 1 tablet by mouth Every Night.  Dispense: 30 tablet; Refill: 2    5. Acquired hypothyroidism  -     TSH  -     T4, Free    Will start Seroquel for insomnia and mood stabilizer.  will call therapist to ask for earlier appt and she iff they have a psychiatrist who will see her.  Call and schedule appt w Dr Cisneros.  FU here one month. Consider increasing dose of Aricept.         Reviewed medications, potential side effects and signs and symptoms to report. Discussed risk versus benefits of treatment plan with patient and/or family-including medications, labs and radiology that may be ordered. Addressed questions and concerns during visit. Patient and/or family verbalized understanding and agree with plan. Instructed to call the office with any questions and report  to ER with any life-threatening symptoms.    During this visit the following were done:  Labs Reviewed [x]    Labs Ordered []    Radiology Reports Reviewed [x]    Radiology Ordered []    PCP Records Reviewed []    Referring Provider Records Reviewed []    ER Records Reviewed []    Hospital Records Reviewed []    History Obtained From Family [x]    Radiology Images Reviewed []    Other Reviewed []    Records Requested []      Adis Mcgraw, ROMIE, APRN  6/9/2021

## 2021-06-09 NOTE — TELEPHONE ENCOUNTER
After speaking with Margoth , spoke with patient's pharmacy and discovered she was put on Fluoxetine by her PCP,  and picked up her 40mg BID script on 3/27/2021, 90 day supply.    I have added this to her medication list. Thanks!

## 2021-06-10 ENCOUNTER — TELEPHONE (OUTPATIENT)
Dept: NEUROLOGY | Facility: CLINIC | Age: 71
End: 2021-06-10

## 2021-06-10 LAB
ALBUMIN SERPL-MCNC: 4.5 G/DL (ref 3.5–5.2)
ALBUMIN/GLOB SERPL: 2.1 G/DL
ALP SERPL-CCNC: 91 U/L (ref 39–117)
ALT SERPL-CCNC: 18 U/L (ref 1–33)
AST SERPL-CCNC: 15 U/L (ref 1–32)
BASOPHILS # BLD AUTO: 0.02 10*3/MM3 (ref 0–0.2)
BASOPHILS NFR BLD AUTO: 0.4 % (ref 0–1.5)
BILIRUB SERPL-MCNC: 0.4 MG/DL (ref 0–1.2)
BUN SERPL-MCNC: 6 MG/DL (ref 8–23)
BUN/CREAT SERPL: 7.3 (ref 7–25)
CALCIUM SERPL-MCNC: 9.3 MG/DL (ref 8.6–10.5)
CHLORIDE SERPL-SCNC: 101 MMOL/L (ref 98–107)
CO2 SERPL-SCNC: 22.8 MMOL/L (ref 22–29)
CREAT SERPL-MCNC: 0.82 MG/DL (ref 0.57–1)
EOSINOPHIL # BLD AUTO: 0.04 10*3/MM3 (ref 0–0.4)
EOSINOPHIL NFR BLD AUTO: 0.7 % (ref 0.3–6.2)
ERYTHROCYTE [DISTWIDTH] IN BLOOD BY AUTOMATED COUNT: 12.4 % (ref 12.3–15.4)
GLOBULIN SER CALC-MCNC: 2.1 GM/DL
GLUCOSE SERPL-MCNC: 136 MG/DL (ref 65–99)
HCT VFR BLD AUTO: 38.6 % (ref 34–46.6)
HGB BLD-MCNC: 12.8 G/DL (ref 12–15.9)
IMM GRANULOCYTES # BLD AUTO: 0.01 10*3/MM3 (ref 0–0.05)
IMM GRANULOCYTES NFR BLD AUTO: 0.2 % (ref 0–0.5)
LYMPHOCYTES # BLD AUTO: 1.78 10*3/MM3 (ref 0.7–3.1)
LYMPHOCYTES NFR BLD AUTO: 31.3 % (ref 19.6–45.3)
MCH RBC QN AUTO: 32.2 PG (ref 26.6–33)
MCHC RBC AUTO-ENTMCNC: 33.2 G/DL (ref 31.5–35.7)
MCV RBC AUTO: 97 FL (ref 79–97)
MONOCYTES # BLD AUTO: 0.37 10*3/MM3 (ref 0.1–0.9)
MONOCYTES NFR BLD AUTO: 6.5 % (ref 5–12)
NEUTROPHILS # BLD AUTO: 3.47 10*3/MM3 (ref 1.7–7)
NEUTROPHILS NFR BLD AUTO: 60.9 % (ref 42.7–76)
NRBC BLD AUTO-RTO: 0 /100 WBC (ref 0–0.2)
PLATELET # BLD AUTO: 251 10*3/MM3 (ref 140–450)
POTASSIUM SERPL-SCNC: 3.4 MMOL/L (ref 3.5–5.2)
PROT SERPL-MCNC: 6.6 G/DL (ref 6–8.5)
RBC # BLD AUTO: 3.98 10*6/MM3 (ref 3.77–5.28)
SODIUM SERPL-SCNC: 134 MMOL/L (ref 136–145)
T4 FREE SERPL-MCNC: 1.33 NG/DL (ref 0.93–1.7)
TSH SERPL DL<=0.005 MIU/L-ACNC: 0.96 UIU/ML (ref 0.27–4.2)
VIT B12 SERPL-MCNC: 280 PG/ML (ref 211–946)
WBC # BLD AUTO: 5.69 10*3/MM3 (ref 3.4–10.8)

## 2021-06-10 NOTE — TELEPHONE ENCOUNTER
----- Message from Adis Mcgraw, ROMIE, APRN sent at 6/10/2021  8:15 AM EDT -----  Glucose is slightly elevated. Please follow up with PCP to monitor glucose. Sodium and potassium are slightly low. Thyroid and vit B12 are WNL. No change in current plan of care.

## 2021-06-10 NOTE — TELEPHONE ENCOUNTER
Relayed results to Una who stated verbal understanding and will follow up with PCP about her Glucose, eat something salty and a banana for her potassium. Sounds good!

## 2021-07-07 ENCOUNTER — OFFICE VISIT (OUTPATIENT)
Dept: NEUROLOGY | Facility: CLINIC | Age: 71
End: 2021-07-07

## 2021-07-07 VITALS
WEIGHT: 195 LBS | BODY MASS INDEX: 33.29 KG/M2 | SYSTOLIC BLOOD PRESSURE: 126 MMHG | HEIGHT: 64 IN | HEART RATE: 86 BPM | DIASTOLIC BLOOD PRESSURE: 80 MMHG | OXYGEN SATURATION: 97 %

## 2021-07-07 DIAGNOSIS — G43.009 MIGRAINE WITHOUT AURA AND WITHOUT STATUS MIGRAINOSUS, NOT INTRACTABLE: ICD-10-CM

## 2021-07-07 DIAGNOSIS — R41.3 MEMORY LOSS: Primary | ICD-10-CM

## 2021-07-07 DIAGNOSIS — F32.89 OTHER DEPRESSION: ICD-10-CM

## 2021-07-07 DIAGNOSIS — F51.05 INSOMNIA DUE TO OTHER MENTAL DISORDER: ICD-10-CM

## 2021-07-07 DIAGNOSIS — F99 INSOMNIA DUE TO OTHER MENTAL DISORDER: ICD-10-CM

## 2021-07-07 DIAGNOSIS — G25.1 TREMOR DUE TO MULTIPLE DRUGS: ICD-10-CM

## 2021-07-07 PROCEDURE — 99214 OFFICE O/P EST MOD 30 MIN: CPT | Performed by: NURSE PRACTITIONER

## 2021-07-07 RX ORDER — TOPIRAMATE 100 MG/1
100 TABLET, FILM COATED ORAL DAILY
Qty: 60 TABLET | Refills: 2
Start: 2021-07-07

## 2021-07-07 RX ORDER — ARIPIPRAZOLE 5 MG/1
5 TABLET ORAL DAILY
COMMUNITY
Start: 2021-06-23

## 2021-07-07 RX ORDER — ATORVASTATIN CALCIUM 40 MG/1
TABLET, FILM COATED ORAL
COMMUNITY
Start: 2021-06-22

## 2021-07-07 NOTE — PROGRESS NOTES
Subjective:     Patient ID: Una Villarreal is a 71 y.o. female.    CC:   Chief Complaint   Patient presents with   • Memory Loss       HPI:   History of Present Illness PCP Dr Cisneros  Here to follow up on memory loss, MDD, Tremor  Last visit with me 6/9/2021 Start Seroquil and obtain labs, refer to psych and follow up with PCP.    Psych took her off Seroquel and duloxetine and started Abilify.    Tremor  Had worsening tremor of hands with Seroquel. Better off of seroquel. Has been on Psych meds since 2020, and this is when tremor began.     Insomnia  Taking Trazodone 100mg at night, melatonin, and benadryl for sleep.    Memory Loss  States memory is better and she is more alert and aware of her surroundings. She is keeps notes of things which are important to her.  resports she is much better, less lethargic. No hallucinations.  Last MMSE 3/25/21 27/30.    MDD  Depression is much better. She is up active and communicative. She is dressing daily and applying make up, and jewelry. Pt and  are happy with her moods.        Vitamin B12 (06/09/2021 14:00)  T4, Free (06/09/2021 14:00)  TSH (06/09/2021 14:00)  Comprehensive Metabolic Panel (06/09/2021 14:00)          The following portions of the patient's history were reviewed and updated as appropriate: allergies, current medications, past family history, past medical history, past social history, past surgical history and problem list.      Current Outpatient Medications:   •  albuterol (PROVENTIL) (2.5 MG/3ML) 0.083% nebulizer solution, albuterol sulfate 2.5 mg/3 mL (0.083 %) solution for nebulization, Disp: , Rfl:   •  alendronate (FOSAMAX) 70 MG tablet, , Disp: , Rfl:   •  ARIPiprazole (ABILIFY) 5 MG tablet, Take 5 mg by mouth Daily. FOR 30 DAYS, Disp: , Rfl:   •  atorvastatin (LIPITOR) 40 MG tablet, , Disp: , Rfl:   •  budesonide-formoterol (SYMBICORT) 160-4.5 MCG/ACT inhaler, Inhale 2 puffs As Needed (soa)., Disp: , Rfl:   •  cetirizine (zyrTEC) 10 MG  tablet, , Disp: , Rfl:   •  donepezil (Aricept) 10 MG tablet, Take 1 tablet by mouth Every Night., Disp: 90 tablet, Rfl: 2  •  EPINEPHrine (EPIPEN 2-MARIELLE) 0.3 MG/0.3ML solution auto-injector injection, EpiPen 2-Marielle 0.3 MG/0.3ML KATHY; Patient Sig: EpiPen 2-Marielle 0.3 MG/0.3ML KATHY USE AS DIRECTED AS NEEDED; 0; 28-Jan-2014; Active; STRAWBERRY ALLERGY, Disp: , Rfl:   •  famotidine (PEPCID) 40 MG tablet, , Disp: , Rfl:   •  FLUoxetine (PROzac) 40 MG capsule, Take 40 mg by mouth 2 (Two) Times a Day., Disp: , Rfl:   •  levothyroxine (SYNTHROID, LEVOTHROID) 75 MCG tablet, , Disp: , Rfl:   •  Mometasone Furo-Formoterol Fum (DULERA IN), Inhale 2 puffs Daily., Disp: , Rfl:   •  pantoprazole (PROTONIX) 40 MG EC tablet, , Disp: , Rfl:   •  sennosides-docusate sodium (SENOKOT-S) 8.6-50 MG tablet, Take 2 tablets by mouth Daily. Hold for loose stools., Disp: 60 tablet, Rfl: 1  •  SUMAtriptan (IMITREX) 6 MG/0.5ML solution injection, Inject  under the skin 1 (One) Time As Needed for Migraine., Disp: , Rfl:   •  tiZANidine (ZANAFLEX) 4 MG tablet, Take 4 mg by mouth 3 (Three) Times a Day., Disp: , Rfl:   •  topiramate (Topamax) 100 MG tablet, Take 1 tablet by mouth Daily., Disp: 60 tablet, Rfl: 2  •  traMADol (ULTRAM) 50 MG tablet, , Disp: , Rfl:   •  traZODone (DESYREL) 100 MG tablet, 200 mg Every Night., Disp: , Rfl:      Past Medical History:   Diagnosis Date   • Alcohol dependence (CMS/HCC)     continuous drinking   • Arthritis    • Asthma    • Back pain    • Depression    • Diabetes insipidus (CMS/HCC)    • Disease of thyroid gland    • Encephalitis    • Facet syndrome, lumbar    • Generalized seizure (CMS/HCC) 2/21/2017   • GERD (gastroesophageal reflux disease)    • Glaucoma     bilat eye   • Headache    • Hiatal hernia    • Hypertension    • Kidney stone     present but small- no discomfort at this time    • Lumbar canal stenosis    • Meniscus tear     right knee   • Migraine    • Pituitary cyst (CMS/HCC)    • RA (rheumatoid  "arthritis) (CMS/HCC)    • Seizure (CMS/HCC)     only occured from mylogram-    • Tooth wear     tooth cap in place- upper front    • Wears glasses        Past Surgical History:   Procedure Laterality Date   • APPENDECTOMY     • BRAIN SURGERY      pituitary tumor   • CARPAL TUNNEL RELEASE      left   • CARPAL TUNNEL RELEASE WITH CUBITAL TUNNEL RELEASE Right    • CHOLECYSTECTOMY     • COLONOSCOPY     • ELBOW PROCEDURE      \"fish cutters elbow\" repair- left   • ENDOSCOPY     • HYSTERECTOMY      first partial , then later removed ovaries when tumor showed up    • KNEE CARTILAGE SURGERY      right    • OOPHORECTOMY      bilat ovaries when found tumor so went back after partial hysterectomy    • SPINAL CORD STIMULATOR IMPLANT N/A 10/23/2017    Procedure: SPINAL CORD STIMULATOR INSERTION PHASE 1 [facility];  Surgeon: Ever Samaniego MD;  Location: [facility];  Service:    • TONSILLECTOMY      unsure adnoids - age 7    • TRIGGER FINGER RELEASE Left 2017   • TUBAL ABDOMINAL LIGATION     • TUMOR REMOVAL      fatty tumor on right shoulder   • WISDOM TOOTH EXTRACTION      x3 then later removed 4th        Social History     Socioeconomic History   • Marital status:      Spouse name: Not on file   • Number of children: Not on file   • Years of education: Not on file   • Highest education level: Not on file   Tobacco Use   • Smoking status: Former Smoker     Packs/day: 1.00     Years: 25.00     Pack years: 25.00     Types: Cigarettes     Quit date: 2/15/1987     Years since quittin.4   • Smokeless tobacco: Never Used   • Tobacco comment: did get up to 3 ppd but didnt do it long    Vaping Use   • Vaping Use: Never used   Substance and Sexual Activity   • Alcohol use: Yes     Comment: socially- but tries to drink non alcoholic since when does drink just keeps going- doesnt feel effects of alcohol so keeps drinking    • Drug use: No   • Sexual activity: Defer       Family History   Problem Relation Age " "of Onset   • Lung cancer Maternal Grandmother    • Stroke Maternal Grandfather    • Lung cancer Maternal Grandfather    • Stroke Paternal Grandmother    • Brain cancer Other    • COPD Mother    • Heart failure Father           Objective:  /80   Pulse 86   Ht 162.6 cm (64.02\")   Wt 88.5 kg (195 lb)   SpO2 97%   BMI 33.45 kg/m²     Neurologic Exam     Mental Status   Oriented to person, place, and time.   Follows 3 step commands.   Attention: normal. Concentration: normal.   Speech: speech is normal   Level of consciousness: alert  Knowledge: consistent with education.   Normal comprehension.   Marked improvement of mental status.     Cranial Nerves     CN III, IV, VI   Pupils are equal, round, and reactive to light.  Right pupil: Accommodation: intact.   Left pupil: Accommodation: intact.   CN III: no CN III palsy  CN VI: no CN VI palsy  Nystagmus: none   Diplopia: none  Upgaze: normal  Downgaze: normal  Conjugate gaze: present    CN VII   Facial expression full, symmetric.     CN VIII   Hearing: intact    CN XII   CN XII normal.     Motor Exam   Muscle bulk: normal  Overall muscle tone: normal    Strength   Right biceps: 5/5  Left biceps: 5/5  Right triceps: 5/5  Left triceps: 5/5  Right interossei: 5/5  Left interossei: 5/5  Right quadriceps: 5/5  Left quadriceps: 5/5  Right anterior tibial: 5/5  Left anterior tibial: 5/5  Right posterior tibial: 5/5  Left posterior tibial: 5/5    Sensory Exam   Light touch normal.     Gait, Coordination, and Reflexes     Gait  Gait: normal    Coordination   Romberg: negative  Finger to nose coordination: abnormal  Heel to shin coordination: abnormal    Tremor   Resting tremor: present  Action tremor: left arm and right arm    Reflexes   Right brachioradialis: 2+  Left brachioradialis: 2+  Right biceps: 2+  Left biceps: 2+  Right triceps: 2+  Left triceps: 2+  Right patellar: 2+  Left patellar: 2+  Right achilles: 2+  Left achilles: 2+  Right : 2+  Left : " 2+  Right ankle clonus: absent  Left pendular knee jerk: absentChin tremor         Physical Exam  Eyes:      Pupils: Pupils are equal, round, and reactive to light.   Neurological:      Mental Status: She is oriented to person, place, and time.      Coordination: Finger-Nose-Finger Test abnormal and Heel to Shin Test abnormal. Romberg Test normal.      Gait: Gait is intact.      Deep Tendon Reflexes:      Reflex Scores:       Tricep reflexes are 2+ on the right side and 2+ on the left side.       Bicep reflexes are 2+ on the right side and 2+ on the left side.       Brachioradialis reflexes are 2+ on the right side and 2+ on the left side.       Patellar reflexes are 2+ on the right side and 2+ on the left side.       Achilles reflexes are 2+ on the right side and 2+ on the left side.  Psychiatric:         Speech: Speech normal.            Assessment/Plan:     Diagnoses and all orders for this visit:    1. Memory loss (Primary)  Comments:  Cont Aricept 10mg    2. Insomnia due to other mental disorder  Comments:  Stop Benadryl. Cont Trazodone and melatonin    3. Other depression  Comments:  Cont Abilify, prozac    4. Tremor due to multiple drugs  Comments:  Stop benadryl and decrease TPM to 100 q hs    5. Migraine without aura and without status migrainosus, not intractable  Comments:  Decrease TPM to 100 qhs, Cont Sumatriptan  Orders:  -     topiramate (Topamax) 100 MG tablet; Take 1 tablet by mouth Daily.  Dispense: 60 tablet; Refill: 2             Reviewed medications, potential side effects and signs and symptoms to report. Discussed risk versus benefits of treatment plan with patient and/or family-including medications, labs and radiology that may be ordered. Addressed questions and concerns during visit. Patient and/or family verbalized understanding and agree with plan. Instructed to call the office with any questions and report to ER with any life-threatening symptoms.    During this visit the following were  done:  Labs Reviewed [x]    Labs Ordered []    Radiology Reports Reviewed []    Radiology Ordered []    PCP Records Reviewed []    Referring Provider Records Reviewed []    ER Records Reviewed []    Hospital Records Reviewed []    History Obtained From Family []    Radiology Images Reviewed []    Other Reviewed [x]    Records Requested []      Adis Mcgraw, ROMIE, APRN  7/7/2021

## 2021-09-24 ENCOUNTER — HOSPITAL ENCOUNTER (OUTPATIENT)
Dept: CT IMAGING | Facility: HOSPITAL | Age: 71
Discharge: HOME | End: 2021-09-24
Admitting: INTERNAL MEDICINE

## 2021-09-24 DIAGNOSIS — R91.1 LUNG NODULE: ICD-10-CM

## 2021-09-24 PROCEDURE — 71250 CT THORAX DX C-: CPT

## 2021-09-30 ENCOUNTER — OFFICE VISIT (OUTPATIENT)
Dept: NEUROLOGY | Facility: CLINIC | Age: 71
End: 2021-09-30

## 2021-09-30 VITALS
DIASTOLIC BLOOD PRESSURE: 76 MMHG | TEMPERATURE: 98.2 F | SYSTOLIC BLOOD PRESSURE: 120 MMHG | HEIGHT: 64 IN | WEIGHT: 193 LBS | BODY MASS INDEX: 32.95 KG/M2

## 2021-09-30 DIAGNOSIS — F32.89 OTHER DEPRESSION: Chronic | ICD-10-CM

## 2021-09-30 DIAGNOSIS — R41.3 MEMORY LOSS: Primary | Chronic | ICD-10-CM

## 2021-09-30 PROCEDURE — 99214 OFFICE O/P EST MOD 30 MIN: CPT | Performed by: PSYCHIATRY & NEUROLOGY

## 2021-09-30 RX ORDER — SUMATRIPTAN 25 MG/1
TABLET, FILM COATED ORAL AS NEEDED
COMMUNITY
Start: 2021-07-21

## 2021-09-30 NOTE — PROGRESS NOTES
"Chief Complaint  Memory Loss    Subjective          Una Villarreal presents to [facility] NEUROLOGY     History of Present Illness    71 y.o. female returns in follow up.  Last visit on 7/7/21 continued Aricept, stopped Benadryl, decreased TPM.      MRI Brain 3/17/21 R > L volume loss mesial temporal lobes     EEG - normal      Labs CBC, CMP, TSH, B12, RPR, NH3, ESR - NCS      Memory has improved with control of mood.      Abilify has made a significant.     Labile emotions.      Denies hallucinations.     No HA and tremor in jaw.       Problem history:     Pt reports trouble with memory.  Pt has to think about how to turn on vacuum.. Trouble with familiar recipes.        has to remind her to finish tasks.      Mood is irritable and labile.      Sx started after loss of Father a year ago.       Admitted to [facility] unit in January.  Hearing voices, radio.  Visual hallucinations.       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      MMSE 27/30      Falling for the last 3 months.  No injuries.     Reviewed medical records:     [facility] admitted for a week.  Prozac stopped and Abilify started, increased Cymbalta.  Reports trouble with driving.  Question of pseudodementia due to mood disorder.    Objective   Vital Signs:   /76   Temp 98.2 °F (36.8 °C)   Ht 162.6 cm (64.02\")   Wt 87.5 kg (193 lb)   BMI 33.11 kg/m²     Physical Exam  Eyes:      Extraocular Movements: EOM normal.      Pupils: Pupils are equal, round, and reactive to light.   Neurological:      Mental Status: She is oriented to person, place, and time.      Gait: Gait is intact.      Deep Tendon Reflexes: Strength normal.   Psychiatric:         Speech: Speech normal.          Neurologic Exam     Mental Status   Oriented to person, place, and time.   Speech: speech is normal   Level of consciousness: alert  Knowledge: good and consistent with education.   Normal comprehension.     Cranial Nerves   Cranial nerves II " through XII intact.     CN II   Visual fields full to confrontation.   Visual acuity: normal  Right visual field deficit: none  Left visual field deficit: none     CN III, IV, VI   Pupils are equal, round, and reactive to light.  Extraocular motions are normal.   Nystagmus: none   Diplopia: none  Ophthalmoparesis: none  Upgaze: normal  Downgaze: normal  Conjugate gaze: present    CN V   Facial sensation intact.   Right corneal reflex: normal  Left corneal reflex: normal    CN VII   Right facial weakness: none  Left facial weakness: none    CN VIII   Hearing: intact    CN IX, X   Palate: symmetric  Right gag reflex: normal  Left gag reflex: normal    CN XI   Right sternocleidomastoid strength: normal  Left sternocleidomastoid strength: normal    CN XII   Tongue: not atrophic  Fasciculations: absent  Tongue deviation: none    Motor Exam   Muscle bulk: normal  Overall muscle tone: normal    Strength   Strength 5/5 throughout.     Sensory Exam   Light touch normal.     Gait, Coordination, and Reflexes     Gait  Gait: normal    Tremor   Resting tremor: absent  Intention tremor: absent  Action tremor: absent    Reflexes   Reflexes 2+ except as noted.      Result Review :   The following data was reviewed by: Terell Talley MD on 09/30/2021:  Common labs    Common Labsle 2/23/21 2/23/21 6/9/21 6/9/21    0000 0000 1400 1400   Glucose  118 (A)  136 (A)   BUN  10  6 (A)   Creatinine  0.89  0.82   eGFR Non  Am  63  69   eGFR African Am  76  84   Sodium  139  134 (A)   Potassium  4.2  3.4 (A)   Chloride  107  101   Calcium  8.8  9.3   Total Protein  6.5  6.6   Albumin  3.90  4.50   Total Bilirubin  0.2  0.4   Alkaline Phosphatase  83  91   AST (SGOT)  15  15   ALT (SGPT)  17  18   WBC 4.72  5.69    Hemoglobin 11.5 (A)  12.8    Hematocrit 35.4  38.6    Platelets 250  251    (A) Abnormal value       Comments are available for some flowsheets but are not being displayed.                     Assessment and Plan     Diagnoses and all orders for this visit:    1. Memory loss (Primary)  Assessment & Plan:  Sx stable on Aricept       2. Other depression  Assessment & Plan:  Patient's depression is recurrent and is mild without psychosis. Their depression is currently in partial remission and the condition is improving with treatment. This will be reassessed at the next regular appointment. F/U as described:patient will continue current medication therapy.        Follow Up   No follow-ups on file.  Patient was given instructions and counseling regarding her condition or for health maintenance advice. Please see specific information pulled into the AVS if appropriate.

## 2024-08-07 ENCOUNTER — APPOINTMENT (OUTPATIENT)
Dept: URBAN - METROPOLITAN AREA CLINIC 204 | Age: 74
Setting detail: DERMATOLOGY
End: 2024-08-07

## 2024-08-07 DIAGNOSIS — L72.0 EPIDERMAL CYST: ICD-10-CM

## 2024-08-07 DIAGNOSIS — B07.8 OTHER VIRAL WARTS: ICD-10-CM

## 2024-08-07 PROCEDURE — OTHER COUNSELING: OTHER

## 2024-08-07 PROCEDURE — 99203 OFFICE O/P NEW LOW 30 MIN: CPT

## 2024-08-07 PROCEDURE — OTHER MIPS QUALITY: OTHER

## 2024-08-07 PROCEDURE — OTHER ADDITIONAL NOTES: OTHER

## 2024-08-07 ASSESSMENT — LOCATION SIMPLE DESCRIPTION DERM
LOCATION SIMPLE: LEFT RING FINGERNAIL
LOCATION SIMPLE: LEFT CHEEK
LOCATION SIMPLE: LEFT RING FINGER

## 2024-08-07 ASSESSMENT — LOCATION ZONE DERM
LOCATION ZONE: FACE
LOCATION ZONE: FINGERNAIL
LOCATION ZONE: FINGER

## 2024-08-07 ASSESSMENT — LOCATION DETAILED DESCRIPTION DERM
LOCATION DETAILED: LEFT RING FINGERNAIL
LOCATION DETAILED: LEFT MEDIAL MALAR CHEEK
LOCATION DETAILED: LEFT RING FINGERTIP

## 2024-08-07 NOTE — HPI: SKIN LESION
What Type Of Note Output Would You Prefer (Optional)?: Bullet Format
Is This A New Presentation, Or A Follow-Up?: Growths
Additional History: Pt has had the wart frozen off for 2 times and she uses OTC wart pads. They will go away for a bit of time but then come back. She states that it is going underneath her nail too.

## 2024-08-07 NOTE — PROCEDURE: ADDITIONAL NOTES
Detail Level: Detailed
Additional Notes: Treated with liquid nitrogen then,\\nApply 17% SA liquid inside nail and beside the nail and cover with duct tape or bandaid everyday until white and macerated. If no improvement afterwards plan a shave biopsy.
Render Risk Assessment In Note?: yes
Additional Notes: Treated with cryogen. Provided cryogen handout

## 2024-08-07 NOTE — HPI: SECONDARY COMPLAINT
How Severe Is This Condition?: mild
Additional History: Pt states the lesion has been there for 1 year and she has tried exfoliating and moisturizing it but it does not go away.

## 2024-09-19 ENCOUNTER — APPOINTMENT (OUTPATIENT)
Dept: URBAN - METROPOLITAN AREA CLINIC 204 | Age: 74
Setting detail: DERMATOLOGY
End: 2024-09-19

## 2024-09-19 DIAGNOSIS — L72.0 EPIDERMAL CYST: ICD-10-CM

## 2024-09-19 DIAGNOSIS — B07.8 OTHER VIRAL WARTS: ICD-10-CM

## 2024-09-19 PROCEDURE — 17110 DESTRUCT B9 LESION 1-14: CPT

## 2024-09-19 PROCEDURE — OTHER ADDITIONAL NOTES: OTHER

## 2024-09-19 PROCEDURE — 99212 OFFICE O/P EST SF 10 MIN: CPT | Mod: 25

## 2024-09-19 PROCEDURE — OTHER LIQUID NITROGEN: OTHER

## 2024-09-19 PROCEDURE — OTHER MIPS QUALITY: OTHER

## 2024-09-19 PROCEDURE — OTHER COUNSELING: OTHER

## 2024-09-19 ASSESSMENT — LOCATION ZONE DERM
LOCATION ZONE: FINGERNAIL
LOCATION ZONE: FACE
LOCATION ZONE: FINGER

## 2024-09-19 ASSESSMENT — LOCATION DETAILED DESCRIPTION DERM
LOCATION DETAILED: LEFT RING FINGERTIP
LOCATION DETAILED: LEFT RING FINGERNAIL
LOCATION DETAILED: PERIUNGUAL SKIN LEFT RING FINGER
LOCATION DETAILED: LEFT MEDIAL MALAR CHEEK

## 2024-09-19 ASSESSMENT — LOCATION SIMPLE DESCRIPTION DERM
LOCATION SIMPLE: LEFT RING FINGERNAIL
LOCATION SIMPLE: LEFT RING FINGER
LOCATION SIMPLE: LEFT CHEEK

## 2024-09-19 NOTE — HPI: WART (PATIENT REPORTED)
Where Is Your Wart Located?: Left ring finger
List Over The Counter Wart Treatments You Are Currently Using (Separate Each Name With A Comma):: S.A. Liquid
Additional Comments (Use Complete Sentences): Pt states that her wartime went away but has started to come back. She says that she has restarted the treatment plan.

## 2024-09-19 NOTE — PROCEDURE: LIQUID NITROGEN
Show Topical Anesthesia Variable?: Yes
Consent: The patient's consent was obtained including but not limited to risks of crusting, scabbing, blistering, scarring, darker or lighter pigmentary change, recurrence, incomplete removal and infection.
Medical Necessity Clause: This procedure was medically necessary because the lesions that were treated were:
Spray Paint Technique: No
Spray Paint Text: The liquid nitrogen was applied to the skin utilizing a spray paint frosting technique.
Medical Necessity Information: It is in your best interest to select a reason for this procedure from the list below. All of these items fulfill various CMS LCD requirements except the new and changing color options.
Post-Care Instructions: I reviewed with the patient in detail post-care instructions. Patient is to wear sunprotection, and avoid picking at any of the treated lesions. Pt may apply Vaseline to crusted or scabbing areas.
Detail Level: Detailed

## 2024-09-19 NOTE — PROCEDURE: ADDITIONAL NOTES
Detail Level: Detailed
Additional Notes: Treated with liquid nitrogen then,\\nApply 17% SA liquid inside nail and beside the nail and cover with duct tape or bandaid everyday until white and macerated.\\nFollow up in 6 weeks. if no improvement, can do a shave removal.
Render Risk Assessment In Note?: yes

## 2024-11-05 ENCOUNTER — APPOINTMENT (OUTPATIENT)
Dept: URBAN - METROPOLITAN AREA CLINIC 204 | Age: 74
Setting detail: DERMATOLOGY
End: 2024-11-05

## 2024-11-05 DIAGNOSIS — D485 NEOPLASM OF UNCERTAIN BEHAVIOR OF SKIN: ICD-10-CM

## 2024-11-05 PROBLEM — D48.5 NEOPLASM OF UNCERTAIN BEHAVIOR OF SKIN: Status: ACTIVE | Noted: 2024-11-05

## 2024-11-05 PROCEDURE — OTHER PRESCRIPTION: OTHER

## 2024-11-05 PROCEDURE — OTHER MIPS QUALITY: OTHER

## 2024-11-05 PROCEDURE — 11755 BIOPSY NAIL UNIT: CPT

## 2024-11-05 PROCEDURE — OTHER BIOPSY BY SHAVE METHOD: OTHER

## 2024-11-05 PROCEDURE — OTHER COUNSELING: OTHER

## 2024-11-05 RX ORDER — CEFADROXIL 500 MG/1
CAPSULE ORAL TWICE A DAY
Qty: 14 | Refills: 0 | Status: ERX | COMMUNITY
Start: 2024-11-05

## 2024-11-05 ASSESSMENT — LOCATION SIMPLE DESCRIPTION DERM
LOCATION SIMPLE: LEFT RING FINGERNAIL
LOCATION SIMPLE: LEFT RING FINGER

## 2024-11-05 ASSESSMENT — LOCATION ZONE DERM
LOCATION ZONE: FINGERNAIL
LOCATION ZONE: FINGER

## 2024-11-05 ASSESSMENT — LOCATION DETAILED DESCRIPTION DERM
LOCATION DETAILED: LEFT RING FINGERTIP
LOCATION DETAILED: LEFT RING FINGERNAIL

## 2024-11-05 NOTE — PROCEDURE: MIPS QUALITY
Detail Level: Detailed
Quality 47: Advance Care Plan: Advance Care Planning discussed and documented; advance care plan or surrogate decision maker documented in the medical record.
Quality 226: Preventive Care And Screening: Tobacco Use: Screening And Cessation Intervention: Patient screened for tobacco use and is an ex/non-smoker
home

## 2024-11-05 NOTE — PROCEDURE: BIOPSY BY SHAVE METHOD
Detail Level: Detailed
Depth Of Biopsy: dermis
Was A Bandage Applied: Yes
Size Of Lesion In Cm: 0.6
X Size Of Lesion In Cm: 0
Biopsy Type: H and E
Biopsy Method: double edge Personna blade
Anesthesia Type: 1% lidocaine with epinephrine and a 1:10 solution of 8.4% sodium bicarbonate
Anesthesia Volume In Cc: 0.5
Hemostasis: TCA 30%
Wound Care: Petrolatum
Dressing: bandage
Destruction After The Procedure: No
Type Of Destruction Used: Electrodesiccation and Curettage
Curettage Text: The wound bed was treated with curettage after the biopsy was performed.
Cryotherapy Text: The wound bed was treated with cryotherapy after the biopsy was performed.
Electrodesiccation Text: The wound bed was treated with electrodesiccation after the biopsy was performed.
Electrodesiccation And Curettage Text: The wound bed was treated with electrodesiccation and curettage after the biopsy was performed.
Silver Nitrate Text: The wound bed was treated with silver nitrate after the biopsy was performed.
Lab: [id number]
Lab Facility: [id number]
Consent: Written consent was obtained and risks were reviewed including but not limited to scarring, infection, bleeding, scabbing, incomplete removal, nerve damage and allergy to anesthesia.
Post-Care Instructions: I reviewed with the patient in detail post-care instructions. Patient is to keep the biopsy site dry overnight, and then apply bacitracin twice daily until healed. Patient may apply hydrogen peroxide soaks to remove any crusting.
Notification Instructions: Patient will be notified of biopsy results. However, patient instructed to call the office if not contacted within 2 weeks.
Billing Type: Third-Party Bill
Information: Selecting Yes will display possible errors in your note based on the variables you have selected. This validation is only offered as a suggestion for you. PLEASE NOTE THAT THE VALIDATION TEXT WILL BE REMOVED WHEN YOU FINALIZE YOUR NOTE. IF YOU WANT TO FAX A PRELIMINARY NOTE YOU WILL NEED TO TOGGLE THIS TO 'NO' IF YOU DO NOT WANT IT IN YOUR FAXED NOTE.

## 2025-07-18 ENCOUNTER — APPOINTMENT (OUTPATIENT)
Dept: URBAN - METROPOLITAN AREA CLINIC 204 | Age: 75
Setting detail: DERMATOLOGY
End: 2025-07-18

## 2025-07-18 DIAGNOSIS — B07.8 OTHER VIRAL WARTS: ICD-10-CM

## 2025-07-18 PROCEDURE — OTHER RECOMMENDATIONS: OTHER

## 2025-07-18 PROCEDURE — OTHER COUNSELING: OTHER

## 2025-07-18 PROCEDURE — OTHER LIQUID NITROGEN: OTHER

## 2025-07-18 PROCEDURE — OTHER MIPS QUALITY: OTHER

## 2025-07-18 PROCEDURE — 17110 DESTRUCT B9 LESION 1-14: CPT

## 2025-07-18 ASSESSMENT — LOCATION DETAILED DESCRIPTION DERM
LOCATION DETAILED: LEFT RING FINGERTIP
LOCATION DETAILED: RIGHT DISTAL DORSAL INDEX FINGER
LOCATION DETAILED: PERIUNGUAL SKIN LEFT RING FINGER
LOCATION DETAILED: LEFT RING FINGERNAIL

## 2025-07-18 ASSESSMENT — LOCATION SIMPLE DESCRIPTION DERM
LOCATION SIMPLE: LEFT RING FINGERNAIL
LOCATION SIMPLE: RIGHT INDEX FINGER
LOCATION SIMPLE: LEFT RING FINGER

## 2025-07-18 ASSESSMENT — LOCATION ZONE DERM
LOCATION ZONE: FINGERNAIL
LOCATION ZONE: FINGER

## (undated) DEVICE — 3M™ IOBAN™ 2 ANTIMICROBIAL INCISE DRAPE 6650EZ: Brand: IOBAN™ 2

## (undated) DEVICE — DRSNG WND BORDR/ADHS NONADHR/GZ LF 4X4IN STRL

## (undated) DEVICE — SUT SILK 2/0 SH CR8 18IN CR8 C012D

## (undated) DEVICE — MEDI-VAC NON-CONDUCTIVE SUCTION TUBING: Brand: CARDINAL HEALTH

## (undated) DEVICE — ANTIBACTERIAL UNDYED BRAIDED (POLYGLACTIN 910), SYNTHETIC ABSORBABLE SUTURE: Brand: COATED VICRYL

## (undated) DEVICE — CANNULA,OXY,ADULT,SUPERSOFT,W/7'TUB,UC: Brand: MEDLINE

## (undated) DEVICE — MEDI-VAC YANKAUER SUCTION HANDLE W/BULBOUS TIP: Brand: CARDINAL HEALTH

## (undated) DEVICE — ENCORE® LATEX MICRO SIZE 7, STERILE LATEX POWDER-FREE SURGICAL GLOVE: Brand: ENCORE

## (undated) DEVICE — 3M™ STERI-STRIP™ REINFORCED ADHESIVE SKIN CLOSURES, R1547, 1/2 IN X 4 IN (12 MM X 100 MM), 6 STRIPS/ENVELOPE: Brand: 3M™ STERI-STRIP™

## (undated) DEVICE — DISPOSABLE IRRIGATION BIPOLAR CORD, M1000 TYPE: Brand: KIRWAN

## (undated) DEVICE — TOOL 14MH30D LEGEND 14CM 3MM MH DIAM: Brand: MIDAS REX ™

## (undated) DEVICE — SUT VIC PLS CTD ANTIB BR 3/0 8/18IN 45CM

## (undated) DEVICE — SYS SKIN CLS DERMABOND PRINEO W/22CM MESH TP

## (undated) DEVICE — ACCY PA700 LUBRICANT DIFFUSER MR7 4 PACK: Brand: MIDAS REX

## (undated) DEVICE — IRRIGATOR BULB ASEPTO 60CC STRL

## (undated) DEVICE — BRAIN SPATULA, 7 7/8", (200 MM), DOUBLE ENDED, MALLEABLE, WIDTH: 10 MM, SILICONE, STERILE, DISPOSABLE, PACKAGE OF 10 PIECES: Brand: AESCULAP

## (undated) DEVICE — ENCORE® LATEX MICRO SIZE 7.5, STERILE LATEX POWDER-FREE SURGICAL GLOVE: Brand: ENCORE

## (undated) DEVICE — SUT SILK 2/0 TIES 18IN A185H

## (undated) DEVICE — ENCORE® LATEX MICRO SIZE 6.5, STERILE LATEX POWDER-FREE SURGICAL GLOVE: Brand: ENCORE

## (undated) DEVICE — PK NEURO DISC 10

## (undated) DEVICE — ADHS LIQ MASTISOL 2/3ML

## (undated) DEVICE — HDRST INTUB GENTLETOUCH SLOT 7IN RT

## (undated) DEVICE — APPL CHLORAPREP W/TINT 26ML BLU

## (undated) DEVICE — PROXIMATE RH ROTATING HEAD SKIN STAPLERS (35 WIDE) CONTAINS 35 STAINLESS STEEL STAPLES: Brand: PROXIMATE

## (undated) DEVICE — ELECTRD BLD EXT EDGE/INSUL 1P 4IN